# Patient Record
Sex: MALE | Race: BLACK OR AFRICAN AMERICAN | NOT HISPANIC OR LATINO | Employment: FULL TIME | URBAN - METROPOLITAN AREA
[De-identification: names, ages, dates, MRNs, and addresses within clinical notes are randomized per-mention and may not be internally consistent; named-entity substitution may affect disease eponyms.]

---

## 2020-06-30 PROCEDURE — U0003 INFECTIOUS AGENT DETECTION BY NUCLEIC ACID (DNA OR RNA); SEVERE ACUTE RESPIRATORY SYNDROME CORONAVIRUS 2 (SARS-COV-2) (CORONAVIRUS DISEASE [COVID-19]), AMPLIFIED PROBE TECHNIQUE, MAKING USE OF HIGH THROUGHPUT TECHNOLOGIES AS DESCRIBED BY CMS-2020-01-R: HCPCS | Performed by: NURSE PRACTITIONER

## 2020-07-02 ENCOUNTER — TELEPHONE (OUTPATIENT)
Dept: URGENT CARE | Facility: CLINIC | Age: 40
End: 2020-07-02

## 2020-07-02 NOTE — TELEPHONE ENCOUNTER
Pt called [facility] returning vm; informed of negative covid result. States he is still symptom free. No questions.

## 2020-11-19 PROCEDURE — U0003 INFECTIOUS AGENT DETECTION BY NUCLEIC ACID (DNA OR RNA); SEVERE ACUTE RESPIRATORY SYNDROME CORONAVIRUS 2 (SARS-COV-2) (CORONAVIRUS DISEASE [COVID-19]), AMPLIFIED PROBE TECHNIQUE, MAKING USE OF HIGH THROUGHPUT TECHNOLOGIES AS DESCRIBED BY CMS-2020-01-R: HCPCS | Performed by: PHYSICIAN ASSISTANT

## 2021-01-15 ENCOUNTER — IMMUNIZATION (OUTPATIENT)
Dept: VACCINE CLINIC | Facility: HOSPITAL | Age: 41
End: 2021-01-15

## 2021-01-15 PROCEDURE — 0001A: CPT | Performed by: INTERNAL MEDICINE

## 2021-01-15 PROCEDURE — 0002A: CPT | Performed by: INTERNAL MEDICINE

## 2021-01-15 PROCEDURE — 91300 HC SARSCOV02 VAC 30MCG/0.3ML IM: CPT | Performed by: INTERNAL MEDICINE

## 2021-02-05 ENCOUNTER — IMMUNIZATION (OUTPATIENT)
Dept: VACCINE CLINIC | Facility: HOSPITAL | Age: 41
End: 2021-02-05

## 2021-02-05 PROCEDURE — 0002A: CPT | Performed by: INTERNAL MEDICINE

## 2021-02-05 PROCEDURE — 91300 HC SARSCOV02 VAC 30MCG/0.3ML IM: CPT | Performed by: INTERNAL MEDICINE

## 2022-01-15 PROCEDURE — U0004 COV-19 TEST NON-CDC HGH THRU: HCPCS | Performed by: NURSE PRACTITIONER

## 2023-01-13 ENCOUNTER — LAB (OUTPATIENT)
Dept: INTERNAL MEDICINE | Facility: CLINIC | Age: 43
End: 2023-01-13
Payer: COMMERCIAL

## 2023-01-13 ENCOUNTER — OFFICE VISIT (OUTPATIENT)
Dept: INTERNAL MEDICINE | Facility: CLINIC | Age: 43
End: 2023-01-13
Payer: COMMERCIAL

## 2023-01-13 VITALS
HEART RATE: 73 BPM | DIASTOLIC BLOOD PRESSURE: 80 MMHG | TEMPERATURE: 98.2 F | SYSTOLIC BLOOD PRESSURE: 128 MMHG | HEIGHT: 71 IN | WEIGHT: 315 LBS | OXYGEN SATURATION: 97 % | BODY MASS INDEX: 44.1 KG/M2

## 2023-01-13 DIAGNOSIS — Z11.59 NEED FOR HEPATITIS C SCREENING TEST: ICD-10-CM

## 2023-01-13 DIAGNOSIS — M54.41 ACUTE BILATERAL LOW BACK PAIN WITH RIGHT-SIDED SCIATICA: Primary | ICD-10-CM

## 2023-01-13 DIAGNOSIS — E66.01 CLASS 3 SEVERE OBESITY WITH BODY MASS INDEX (BMI) OF 45.0 TO 49.9 IN ADULT, UNSPECIFIED OBESITY TYPE, UNSPECIFIED WHETHER SERIOUS COMORBIDITY PRESENT: ICD-10-CM

## 2023-01-13 DIAGNOSIS — I25.118 CORONARY ARTERY DISEASE WITH OTHER FORM OF ANGINA PECTORIS, UNSPECIFIED VESSEL OR LESION TYPE, UNSPECIFIED WHETHER NATIVE OR TRANSPLANTED HEART: ICD-10-CM

## 2023-01-13 DIAGNOSIS — E78.5 DYSLIPIDEMIA: ICD-10-CM

## 2023-01-13 DIAGNOSIS — I10 PRIMARY HYPERTENSION: ICD-10-CM

## 2023-01-13 LAB
BASOPHILS # BLD AUTO: 0.03 10*3/MM3 (ref 0–0.2)
BASOPHILS NFR BLD AUTO: 0.2 % (ref 0–1.5)
DEPRECATED RDW RBC AUTO: 39.6 FL (ref 37–54)
EOSINOPHIL # BLD AUTO: 0.05 10*3/MM3 (ref 0–0.4)
EOSINOPHIL NFR BLD AUTO: 0.4 % (ref 0.3–6.2)
ERYTHROCYTE [DISTWIDTH] IN BLOOD BY AUTOMATED COUNT: 12.4 % (ref 12.3–15.4)
HCT VFR BLD AUTO: 42.2 % (ref 37.5–51)
HGB BLD-MCNC: 14.2 G/DL (ref 13–17.7)
IMM GRANULOCYTES # BLD AUTO: 0.25 10*3/MM3 (ref 0–0.05)
IMM GRANULOCYTES NFR BLD AUTO: 2.1 % (ref 0–0.5)
LYMPHOCYTES # BLD AUTO: 1.37 10*3/MM3 (ref 0.7–3.1)
LYMPHOCYTES NFR BLD AUTO: 11.3 % (ref 19.6–45.3)
MCH RBC QN AUTO: 29.9 PG (ref 26.6–33)
MCHC RBC AUTO-ENTMCNC: 33.6 G/DL (ref 31.5–35.7)
MCV RBC AUTO: 88.8 FL (ref 79–97)
MONOCYTES # BLD AUTO: 0.98 10*3/MM3 (ref 0.1–0.9)
MONOCYTES NFR BLD AUTO: 8.1 % (ref 5–12)
NEUTROPHILS NFR BLD AUTO: 77.9 % (ref 42.7–76)
NEUTROPHILS NFR BLD AUTO: 9.45 10*3/MM3 (ref 1.7–7)
NRBC BLD AUTO-RTO: 0 /100 WBC (ref 0–0.2)
PLATELET # BLD AUTO: 336 10*3/MM3 (ref 140–450)
PMV BLD AUTO: 10.7 FL (ref 6–12)
RBC # BLD AUTO: 4.75 10*6/MM3 (ref 4.14–5.8)
WBC NRBC COR # BLD: 12.13 10*3/MM3 (ref 3.4–10.8)

## 2023-01-13 PROCEDURE — 80061 LIPID PANEL: CPT | Performed by: STUDENT IN AN ORGANIZED HEALTH CARE EDUCATION/TRAINING PROGRAM

## 2023-01-13 PROCEDURE — 99214 OFFICE O/P EST MOD 30 MIN: CPT | Performed by: STUDENT IN AN ORGANIZED HEALTH CARE EDUCATION/TRAINING PROGRAM

## 2023-01-13 PROCEDURE — 83036 HEMOGLOBIN GLYCOSYLATED A1C: CPT | Performed by: STUDENT IN AN ORGANIZED HEALTH CARE EDUCATION/TRAINING PROGRAM

## 2023-01-13 PROCEDURE — 80050 GENERAL HEALTH PANEL: CPT | Performed by: STUDENT IN AN ORGANIZED HEALTH CARE EDUCATION/TRAINING PROGRAM

## 2023-01-13 PROCEDURE — 86803 HEPATITIS C AB TEST: CPT | Performed by: STUDENT IN AN ORGANIZED HEALTH CARE EDUCATION/TRAINING PROGRAM

## 2023-01-13 RX ORDER — TIZANIDINE 4 MG/1
4 TABLET ORAL NIGHTLY PRN
Qty: 30 TABLET | Refills: 0 | Status: SHIPPED | OUTPATIENT
Start: 2023-01-13 | End: 2023-01-25 | Stop reason: SDUPTHER

## 2023-01-13 NOTE — PROGRESS NOTES
Office Note     Name: Jean Glynn    : 1980     MRN: [id number]     Chief Complaint  Back Pain (Sciatic nerve pain, Establish care did not like previous [facility]/)    Subjective     History of Present Illness:  Jean Glynn is a 42 y.o. male who presents today for       Back pain  Pain initially started at the end of November.  When the patient woke up from sleep  Location of the pain is lower lumbar back.  Bilateral and feels like a dull ache.  Does have associated right lateral leg numbness that shoots down all the way down to his foot.  He has tried muscle relaxants ibuprofen and Tylenol.  Says that ibuprofen works the best.  Pain is improved with certain positions such as when he bends down or stretches out his legs.  He does also improve when he is able to get moving.  He does need to sit down frequently because it helps with the pain.  Is recently seen at the urgent care on 2023 and was started on a steroid taper.  He denies any traumatic injuries to the back no weakness no loss of sensation denies any bladder or bowel movement problems.      History of hypertension blood pressure stable in office  Currently taking losartan 50 mg and coreg 25mg.    He has a history of MI is followed by cardiology at [facility].  He is currently on statin and aspirin carvedilol and Plavix    Dyslipidemia  On statin  Tolerating medication    Review of Systems:   Review of Systems   All other systems reviewed and are negative.      Past Medical History:   Past Medical History:   Diagnosis Date   • Hyperlipidemia    • Hypertension    • Myocardial infarction (HCC)    • Sleep apnea        Past Surgical History:   Past Surgical History:   Procedure Laterality Date   • ADENOIDECTOMY     • TONSILLECTOMY         Family History:   Family History   Problem Relation Age of Onset   • Diabetes Paternal Uncle    • Cancer Paternal Grandmother        Social History:   Social History     Socioeconomic History   • Marital status:  "Single   Tobacco Use   • Smoking status: Never   • Smokeless tobacco: Current   Vaping Use   • Vaping Use: Every day   Substance and Sexual Activity   • Alcohol use: Not Currently   • Drug use: Defer   • Sexual activity: Never       Immunizations:   Immunization History   Administered Date(s) Administered   • COVID-19 (PFIZER) PURPLE CAP 01/15/2021, 02/05/2021        Medications:     Current Outpatient Medications:   •  aspirin 81 MG EC tablet, Take 1 tablet by mouth Daily., Disp: , Rfl:   •  atorvastatin (LIPITOR) 80 MG tablet, Take 1 tablet by mouth Daily., Disp: , Rfl:   •  carvedilol (COREG) 25 MG tablet, Take 2 tablets by mouth 2 (Two) Times a Day With Meals., Disp: , Rfl:   •  clopidogrel (PLAVIX) 75 MG tablet, Take 1 tablet by mouth Daily., Disp: , Rfl:   •  losartan (COZAAR) 50 MG tablet, Take 50 mg by mouth Daily., Disp: , Rfl:   •  cephalexin (KEFLEX) 500 MG capsule, Take 1 capsule by mouth 3 (Three) Times a Day., Disp: 30 capsule, Rfl: 0  •  naproxen (EC NAPROSYN) 500 MG EC tablet, Take 1 tablet by mouth 2 (Two) Times a Day As Needed (pain)., Disp: 30 tablet, Rfl: 0  •  nitroglycerin (NITROSTAT) 0.4 MG SL tablet, Place 1 tablet under the tongue As Needed., Disp: , Rfl:   •  predniSONE (DELTASONE) 10 MG tablet, DAILY TAPER - 6/6/5/5/4/4/3/3/2/2/1/1 THEN D/C, Disp: 42 tablet, Rfl: 0  •  tiZANidine (ZANAFLEX) 4 MG tablet, Take 1 tablet by mouth At Night As Needed for Muscle Spasms., Disp: 30 tablet, Rfl: 0    Allergies:   Allergies   Allergen Reactions   • Codeine Itching, Hives and Unknown (See Comments)   • Ace Inhibitors Other (See Comments), Rash and Unknown (See Comments)     Coughing, seizures  Seizures  Coughing, seizures       Objective     Vital Signs  /80   Pulse 73   Temp 98.2 °F (36.8 °C) (Temporal)   Ht 180 cm (70.87\")   Wt (!) 159 kg (351 lb 9.6 oz)   SpO2 97%   BMI 49.22 kg/m²   Estimated body mass index is 49.22 kg/m² as calculated from the following:    Height as of this " "encounter: 180 cm (70.87\").    Weight as of this encounter: 159 kg (351 lb 9.6 oz).    Class 3 Severe Obesity (BMI >=40). Obesity-related health conditions include the following: obstructive sleep apnea, hypertension, coronary heart disease and dyslipidemias. Obesity is unchanged. BMI is is above average; BMI management plan is completed. We discussed low calorie, low carb based diet program, portion control and increasing exercise.      Physical Exam  Constitutional:       Appearance: Normal appearance. He is obese.   Cardiovascular:      Rate and Rhythm: Regular rhythm.      Pulses: Normal pulses.      Heart sounds: No murmur heard.  Pulmonary:      Effort: Pulmonary effort is normal.      Breath sounds: Normal breath sounds.   Abdominal:      General: Abdomen is flat. There is no distension.      Palpations: Abdomen is soft.      Tenderness: There is no abdominal tenderness.   Musculoskeletal:      Lumbar back: Tenderness and bony tenderness present. Decreased range of motion. Negative right straight leg raise test and negative left straight leg raise test.   Skin:     General: Skin is warm.   Neurological:      Mental Status: He is alert.          Result Review :                  Assessment and Plan     1. Acute bilateral low back pain with right-sided sciatica  Recommended treatment with OTC NSAID/Tylenol, muscle relaxant and physical therapy  IF no improvement, can consider CT scan for additional imaging, patient unable to do MRI unless open MRI due to body habitus    - Ambulatory Referral to Physical Therapy Evaluate and treat  - XR Spine Lumbar 2 or 3 View; Future  - tiZANidine (ZANAFLEX) 4 MG tablet; Take 1 tablet by mouth At Night As Needed for Muscle Spasms.  Dispense: 30 tablet; Refill: 0    2. Primary hypertension  Controlled,  Continue with current medicatoin  Low salt diet, physical activity and weight controlled discussed    - CBC Auto Differential  - Comprehensive Metabolic Panel    3. Class 3 " severe obesity with body mass index (BMI) of 45.0 to 49.9 in adult, unspecified obesity type, unspecified whether serious comorbidity present (HCC)  Discussed risk associated with obesity. he was given instructions on calorie counting as well as on decreasing carbohydrate intake. he was also encouraged to start exercise regimen.  Instructed patient to download fitness joycelyn on his smart phone to aid in calorie counting and exercise tracking.    - TSH  - Hemoglobin A1c    4. Dyslipidemia  Controlled  Continue with atorvastatin 80mg    - Lipid Panel  - TSH    5. Coronary artery disease with other form of angina pectoris, unspecified vessel or lesion type, unspecified whether native or transplanted heart (HCC)  Continue to follow up with cardiologist  Continue with dual anti-platelet therapy, on Ace- inhibitor, on statin  - Lipid Panel  - TSH    6. Need for hepatitis C screening test    - Hepatitis C antibody; Future       Follow Up  Return in about 8 weeks (around 3/10/2023) for back pain, Annual physical.    Aakash Onofre MD  [facility] [address]  [facility]  [address]  [address] [address] [phone]

## 2023-01-14 LAB
ALBUMIN SERPL-MCNC: 4.3 G/DL (ref 3.5–5.2)
ALBUMIN/GLOB SERPL: 1.5 G/DL
ALP SERPL-CCNC: 70 U/L (ref 39–117)
ALT SERPL W P-5'-P-CCNC: 39 U/L (ref 1–41)
ANION GAP SERPL CALCULATED.3IONS-SCNC: 9 MMOL/L (ref 5–15)
AST SERPL-CCNC: 22 U/L (ref 1–40)
BILIRUB SERPL-MCNC: 0.4 MG/DL (ref 0–1.2)
BUN SERPL-MCNC: 18 MG/DL (ref 6–20)
BUN/CREAT SERPL: 19.1 (ref 7–25)
CALCIUM SPEC-SCNC: 9.2 MG/DL (ref 8.6–10.5)
CHLORIDE SERPL-SCNC: 100 MMOL/L (ref 98–107)
CHOLEST SERPL-MCNC: 183 MG/DL (ref 0–200)
CO2 SERPL-SCNC: 29 MMOL/L (ref 22–29)
CREAT SERPL-MCNC: 0.94 MG/DL (ref 0.76–1.27)
EGFRCR SERPLBLD CKD-EPI 2021: 103.2 ML/MIN/1.73
GLOBULIN UR ELPH-MCNC: 2.9 GM/DL
GLUCOSE SERPL-MCNC: 110 MG/DL (ref 65–99)
HBA1C MFR BLD: 6.7 % (ref 4.8–5.6)
HCV AB SER DONR QL: NORMAL
HDLC SERPL-MCNC: 48 MG/DL (ref 40–60)
LDLC SERPL CALC-MCNC: 121 MG/DL (ref 0–100)
LDLC/HDLC SERPL: 2.5 {RATIO}
POTASSIUM SERPL-SCNC: 4.3 MMOL/L (ref 3.5–5.2)
PROT SERPL-MCNC: 7.2 G/DL (ref 6–8.5)
SODIUM SERPL-SCNC: 138 MMOL/L (ref 136–145)
TRIGL SERPL-MCNC: 75 MG/DL (ref 0–150)
TSH SERPL DL<=0.05 MIU/L-ACNC: 1.58 UIU/ML (ref 0.27–4.2)
VLDLC SERPL-MCNC: 14 MG/DL (ref 5–40)

## 2023-01-20 ENCOUNTER — HOSPITAL ENCOUNTER (OUTPATIENT)
Dept: PHYSICAL THERAPY | Facility: HOSPITAL | Age: 43
Setting detail: THERAPIES SERIES
Discharge: HOME | End: 2023-01-20
Payer: COMMERCIAL

## 2023-01-20 DIAGNOSIS — M54.41 ACUTE LOW BACK PAIN WITH RIGHT-SIDED SCIATICA, UNSPECIFIED BACK PAIN LATERALITY: ICD-10-CM

## 2023-01-20 DIAGNOSIS — M54.41 ACUTE BACK PAIN WITH SCIATICA, RIGHT: Primary | ICD-10-CM

## 2023-01-20 PROCEDURE — 97161 PT EVAL LOW COMPLEX 20 MIN: CPT

## 2023-01-20 NOTE — THERAPY EVALUATION
Outpatient Physical Therapy Ortho Initial Evaluation  [facility]     Patient Name: Jean Glynn  : 1980  MRN: [id number]  Today's Date: 2023      Visit Date: 2023    There is no problem list on file for this patient.       Past Medical History:   Diagnosis Date   • Hyperlipidemia    • Hypertension    • Myocardial infarction (HCC)    • Sleep apnea         Past Surgical History:   Procedure Laterality Date   • ADENOIDECTOMY     • TONSILLECTOMY         Visit Dx:     ICD-10-CM ICD-9-CM   1. Acute back pain with sciatica, right  M54.41 724.3   2. Acute low back pain with right-sided sciatica, unspecified back pain laterality  M54.41 724.2     724.3              PT Ortho     Row Name 23 0800       Subjective Comments    Subjective Comments Patient reports that his low back pain started the day after his birthday with no associated trauma trauma or falls to trigger the issue. He reports that the pain has progressed over the last 2 months to the point that he presented to urgent care for treatment.  Patient reports that the pain progressed from his low back and radiates down the posterior aspect leg all the way down to his foot.  Patient endorses numbness and tingling to the entire foot.  He noted that he was given a Dosepak which alleviated his symptoms down his leg and his low back.  However 2 days after completing his Dosepak the pain returned.  Patient reports no history of low back pain prior to this onset.  Patient reports pain gets worse when bending over, while working all day, and is positional.  Patient noted that he feels better when he stands up and starts to move but has difficulty getting out of bed due to pain.  He reports that he has been taking ibuprofen throughout the day to help manage his pain which helps some.  -FW       Subjective Pain    Able to rate subjective pain? yes  -FW    Pre-Treatment Pain Level 7  -FW    Post-Treatment Pain Level 6  -FW       Posture/Observations     Posture- WNL Posture is WNL  -FW    Alignment Options Forward head;Rounded shoulders  -FW       Special Tests/Palpation    Special Tests/Palpation Lumbar/SI  -FW       Lumbosacral Accessory Motions    Lumbosacral Accessory Motions Tested? Yes  -FW    PA Glide- L1 --  difficult to assess lumbar vertebral accessory motions due to body habitus  -FW       Lumbar/SI Special Tests    Slump Test (Neural Tension) Positive;Right:  -FW    SLR (Neural Tension) Positive;Right:;Negative;Left:  -FW    Lumbar/SI Special Tests Comments no tenderness to palpation along the lumbar spine; taught lumbar paraspinals  -FW       General ROM    GENERAL ROM COMMENTS BLE WFL  -FW       MMT (Manual Muscle Testing)    Rt Lower Ext Rt Knee Flexion;Rt Ankle Dorsiflexion;Rt Knee Extension  -FW    Lt Lower Ext Lt Hip WNL;Lt Ankle WNL;Lt Knee WNL  -FW       MMT Right Lower Ext    Rt Knee Extension MMT, Gross Movement (5/5) normal  -FW    Rt Knee Flexion MMT, Gross Movement (4+/5) good plus  -FW    Rt Ankle Dorsiflexion MMT, Gross Movement (5/5) normal  -FW       Sensation    Light Touch Partial deficits in the RLE  -FW    Additional Comments His numbness and tingling in the entire aspect of the foot which improved with manual traction  -FW          User Key  (r) = Recorded By, (t) = Taken By, (c) = Cosigned By    Initials Name Provider Type    FW Guille Delacruz, PT Physical Therapist                            Therapy Education  Education Details: HEP: medbridge [id number], mechanics to avoid flexion, and pain managment  Given: HEP, Symptoms/condition management, Pain management, Posture/body mechanics      PT OP Goals     Row Name 01/20/23 0900          PT Short Term Goals    STG Date to Achieve 02/10/23  -FW     STG 1 He will report improvement in symptoms by 50% or more with ADLs and work duties  -FW     STG 1 Progress New  -FW     STG 2 Reported parents HEP program for optimal outcomes and POC  -FW     STG 2 Progress New  -FW     STG 3  Patient will be able to return to the light to moderate exercise with pain equal less than or equal to 3 out of 10  -FW     STG 3 Progress New  -FW        Long Term Goals    LTG Date to Achieve 03/03/23  -FW     LTG 1 Patient will report improvement in symptoms by 75% or more with all ADLs and associated work duties  -FW     LTG 1 Progress New  -FW     LTG 2 Patient will be able to sit for longer than 60 minutes and lay in all positions in bed without pain or discomfort  -FW     LTG 2 Progress New  -FW     LTG 3 She will be able to return to all exercises of moderate to heavy strain with pain less than or equal to 1 out of 10  -FW     LTG 3 Progress New  -FW        Time Calculation    PT Goal Re-Cert Due Date 02/19/23  -FW           User Key  (r) = Recorded By, (t) = Taken By, (c) = Cosigned By    Initials Name Provider Type    FW Guille Delacruz, PT Physical Therapist                 PT Assessment/Plan     Row Name 01/20/23 0900          PT Assessment    Functional Limitations Impaired gait;Performance in self-care ADL;Performance in sport activities;Performance in work activities;Performance in leisure activities;Limitations in functional capacity and performance;Limitations in community activities;Limitation in home management  -FW     Impairments Gait;Endurance;Impaired aerobic capacity;Impaired flexibility;Pain;Sensation;Range of motion  -FW     Assessment Comments Patient presents with evolving symptoms of low complexity.  Patient presents with signs and symptoms of low back pain with sciatica with likely lumbar disc involvement.  Patient with decreased numbness tingling and pain with manual traction and extension of the lumbar spine.  Skilled physical therapy services warranted to address improvements upon work duties, ADLs, and exercise in order to meet patient goals and maximize function/decrease pain.  -FW     Please refer to paper survey for additional self-reported information Yes  -FW     Rehab  Potential Good  -FW     Patient/caregiver participated in establishment of treatment plan and goals Yes  -FW     Patient would benefit from skilled therapy intervention Yes  -FW        PT Plan    PT Frequency 1x/week;2x/week  -FW     Predicted Duration of Therapy Intervention (PT) 8 to 10 weeks  -FW     Planned CPT's? PT EVAL LOW COMPLEXITY: 60454;PT RE-EVAL: 07609;PT THER PROC EA 15 MIN: 64791;PT THER ACT EA 15 MIN: 64904;PT MANUAL THERAPY EA 15 MIN: 14343;PT NEUROMUSC RE-EDUCATION EA 15 MIN: 59708;PT ELECTRICAL STIM UNATTEND: ;PT TRACTION LUMBAR: 26815  -FW     PT Plan Comments Plan to address patient's impairments and limitations with skilled PT interventions focusing on improving her overall decrease in pain for improved ability to perform activities of daily living, home management, return to activity was a leisure, and all associated work duties.  -FW           User Key  (r) = Recorded By, (t) = Taken By, (c) = Cosigned By    Initials Name Provider Type    Guille De Dios, PT Physical Therapist                   OP Exercises     Row Name 01/20/23 0800             Subjective Comments    Subjective Comments Patient reports that his low back pain started the day after his birthday with no associated trauma trauma or falls to trigger the issue. He reports that the pain has progressed over the last 2 months to the point that he presented to urgent care for treatment.  Patient reports that the pain progressed from his low back and radiates down the posterior aspect leg all the way down to his foot.  Patient endorses numbness and tingling to the entire foot.  He noted that he was given a Dosepak which alleviated his symptoms down his leg and his low back.  However 2 days after completing his Dosepak the pain returned.  Patient reports no history of low back pain prior to this onset.  Patient reports pain gets worse when bending over, while working all day, and is positional.  Patient noted that he feels  better when he stands up and starts to move but has difficulty getting out of bed due to pain.  He reports that he has been taking ibuprofen throughout the day to help manage his pain which helps some.  -FW         Subjective Pain    Able to rate subjective pain? yes  -FW      Pre-Treatment Pain Level 7  -FW      Post-Treatment Pain Level 6  -FW         Exercise 1    Exercise Name 1 prone press up on elbows  -FW      Sets 1 1  -FW      Reps 1 10  -FW      Additional Comments pt noted decreased numbness in his right foot  -FW         Exercise 2    Exercise Name 2 standing lumbar extension w/ counter support  -FW      Sets 2 1  -FW      Reps 2 10  -FW      Additional Comments noted relief of pain  -FW         Exercise 3    Exercise Name 3 standing distal sciatic nerve slider on step  -FW      Sets 3 1  -FW      Reps 3 10  -FW            User Key  (r) = Recorded By, (t) = Taken By, (c) = Cosigned By    Initials Name Provider Type    FW Guille Delacruz, PT Physical Therapist                              Outcome Measure Options: Modified Oswestry  Modified Oswestry  Modified Oswestry Score/Comments: 22% (11/50)      Time Calculation:     Start Time: 0800  Untimed Charges  PT Eval/Re-eval Minutes: 60  Total Minutes  Untimed Charges Total Minutes: 60   Total Minutes: 60     Therapy Charges for Today     Code Description Service Date Service Provider Modifiers Qty    51963929969 HC PT EVAL LOW COMPLEXITY 4 1/20/2023 Guille Delacruz, PT GP 1          PT G-Codes  Outcome Measure Options: Modified Oswestry  Modified Oswestry Score/Comments: 22% (11/50)         Guille Delacruz PT  1/20/2023

## 2023-01-25 DIAGNOSIS — M54.41 ACUTE BILATERAL LOW BACK PAIN WITH RIGHT-SIDED SCIATICA: ICD-10-CM

## 2023-01-25 RX ORDER — TIZANIDINE 4 MG/1
4 TABLET ORAL NIGHTLY PRN
Qty: 30 TABLET | Refills: 3 | Status: SHIPPED | OUTPATIENT
Start: 2023-01-25

## 2023-01-26 ENCOUNTER — TELEPHONE (OUTPATIENT)
Dept: INTERNAL MEDICINE | Facility: CLINIC | Age: 43
End: 2023-01-26
Payer: COMMERCIAL

## 2023-01-26 RX ORDER — METFORMIN HYDROCHLORIDE 500 MG/1
1000 TABLET, EXTENDED RELEASE ORAL
Qty: 90 TABLET | Refills: 1 | Status: SHIPPED | OUTPATIENT
Start: 2023-01-26

## 2023-02-02 ENCOUNTER — HOSPITAL ENCOUNTER (OUTPATIENT)
Dept: PHYSICAL THERAPY | Facility: HOSPITAL | Age: 43
Setting detail: THERAPIES SERIES
Discharge: HOME | End: 2023-02-02
Payer: COMMERCIAL

## 2023-02-02 DIAGNOSIS — M54.41 ACUTE BACK PAIN WITH SCIATICA, RIGHT: Primary | ICD-10-CM

## 2023-02-02 DIAGNOSIS — M54.41 ACUTE LOW BACK PAIN WITH RIGHT-SIDED SCIATICA, UNSPECIFIED BACK PAIN LATERALITY: ICD-10-CM

## 2023-02-02 PROCEDURE — 97140 MANUAL THERAPY 1/> REGIONS: CPT

## 2023-02-02 PROCEDURE — 97110 THERAPEUTIC EXERCISES: CPT

## 2023-02-02 NOTE — THERAPY TREATMENT NOTE
Outpatient Physical Therapy Ortho Treatment Note   [facility]     Patient Name: Jean Glynn  : 1980  MRN: [id number]  Today's Date: 2023      Visit Date: 2023    Visit Dx:    ICD-10-CM ICD-9-CM   1. Acute back pain with sciatica, right  M54.41 724.3   2. Acute low back pain with right-sided sciatica, unspecified back pain laterality  M54.41 724.2     724.3       There is no problem list on file for this patient.       Past Medical History:   Diagnosis Date   • Hyperlipidemia    • Hypertension    • Myocardial infarction (HCC)    • Sleep apnea         Past Surgical History:   Procedure Laterality Date   • ADENOIDECTOMY     • TONSILLECTOMY                          PT Assessment/Plan     Row Name 23 0916          PT Assessment    Assessment Comments Patient reports mild stretch retraction.  He was instructed to notice symptoms over the next day or  to check for efficacy efficacy of traction.  HEP updated this date to help with the distal/radiating symptoms down his right leg. Access Code: [email or web address]  -FW        PT Plan    PT Plan Comments Continue POC  -FW           User Key  (r) = Recorded By, (t) = Taken By, (c) = Cosigned By    Initials Name Provider Type    FW Guille Delacruz, PT Physical Therapist                   OP Exercises     Row Name 23 0923 0830 23 0800       Subjective Comments    Subjective Comments --  -FW Patient reports mild improvement since his last visit.  Having difficulty bending down and lifting heavy objects.  Compliance with his HEP  -FW --       Subjective Pain    Able to rate subjective pain? --  -FW yes  -FW --  -FW    Pre-Treatment Pain Level --  -FW 3  -FW --  -FW    Post-Treatment Pain Level --  -FW -- --       Total Minutes    57376 - PT Therapeutic Exercise Minutes --  -FW 15  -FW --    05589 - PT Manual Therapy Minutes -- 10  -FW --       Exercise 1    Exercise Name 1 --  -FW standing lumbar extension  -FW --  -FW    Sets 1 --  -FW 2   -FW --    Reps 1 --  -FW 10  -FW --       Exercise 2    Exercise Name 2 --  -FW standing calf/hamstring stretch  -FW --    Sets 2 --  -FW 1  -FW --    Reps 2 --  -FW 2  -FW --    Additional Comments --  -FW -- --       Exercise 3    Exercise Name 3 --  -FW LTR  -FW --    Sets 3 --  -FW 1  -FW --    Reps 3 --  -FW 10  -FW --       Exercise 4    Exercise Name 4 -- bridge  -FW --    Sets 4 -- 2  -FW --    Reps 4 -- 10  -FW --       Exercise 5    Exercise Name 5 -- supine sciatic slider  -FW --    Sets 5 -- 1  -FW --    Reps 5 -- 15  -FW --          User Key  (r) = Recorded By, (t) = Taken By, (c) = Cosigned By    Initials Name Provider Type     Guille Delacruz PT Physical Therapist                         Manual Rx (last 36 hours)     Manual Treatments     Row Name 02/02/23 0830             Total Minutes    83614 - PT Manual Therapy Minutes 10  -FW         Manual Rx 1    Manual Rx 1 Location lumbar  -FW      Manual Rx 1 Type manual traction w/ belt  -FW      Manual Rx 1 Duration 10  -FW            User Key  (r) = Recorded By, (t) = Taken By, (c) = Cosigned By    Initials Name Provider Type     Guille Delacruz PT Physical Therapist                                   Time Calculation:   Start Time: 0830  Timed Charges  01939 - PT Therapeutic Exercise Minutes: 15  25656 - PT Manual Therapy Minutes: 10  Total Minutes  Timed Charges Total Minutes: 25   Total Minutes: 25  Therapy Charges for Today     Code Description Service Date Service Provider Modifiers Qty    99008301929 HC PT THER PROC EA 15 MIN 2/2/2023 Guille Delacruz, PT GP 1    45574581075 HC PT MANUAL THERAPY EA 15 MIN 2/2/2023 Guille Delacruz, PT GP 1                    Guille Delacruz PT  2/2/2023

## 2023-02-06 ENCOUNTER — HOSPITAL ENCOUNTER (OUTPATIENT)
Dept: PHYSICAL THERAPY | Facility: HOSPITAL | Age: 43
Setting detail: THERAPIES SERIES
Discharge: HOME | End: 2023-02-06
Payer: COMMERCIAL

## 2023-02-06 DIAGNOSIS — M54.41 ACUTE LOW BACK PAIN WITH RIGHT-SIDED SCIATICA, UNSPECIFIED BACK PAIN LATERALITY: ICD-10-CM

## 2023-02-06 DIAGNOSIS — M54.41 ACUTE BACK PAIN WITH SCIATICA, RIGHT: Primary | ICD-10-CM

## 2023-02-06 PROCEDURE — 97110 THERAPEUTIC EXERCISES: CPT

## 2023-02-06 PROCEDURE — 97012 MECHANICAL TRACTION THERAPY: CPT

## 2023-02-06 NOTE — THERAPY TREATMENT NOTE
Outpatient Physical Therapy Ortho Treatment Note   [facility]     Patient Name: Jean Glynn  : 1980  MRN: [id number]  Today's Date: 2023      Visit Date: 2023    Visit Dx:    ICD-10-CM ICD-9-CM   1. Acute back pain with sciatica, right  M54.41 724.3   2. Acute low back pain with right-sided sciatica, unspecified back pain laterality  M54.41 724.2     724.3       There is no problem list on file for this patient.       Past Medical History:   Diagnosis Date   • Hyperlipidemia    • Hypertension    • Myocardial infarction (HCC)    • Sleep apnea         Past Surgical History:   Procedure Laterality Date   • ADENOIDECTOMY     • TONSILLECTOMY                          PT Assessment/Plan     Row Name 23 09          PT Assessment    Assessment Comments Patient noted improvement in pain following the traction during his last session.  This session the patient was on mechanical traction and will check with long-term efficacy during his next visit.  Patient HEP updated with bird-dog and cat cow exercises.  Briefly discussed benefit of weight loss with patient at end of session.  Patient noted hip pain in the piriformis region which will be addressed during his next visit. Access Code: [email or web address]  -FW        PT Plan    PT Plan Comments Continue POC  -FW           User Key  (r) = Recorded By, (t) = Taken By, (c) = Cosigned By    Initials Name Provider Type    FW Guille Delacruz, PT Physical Therapist                 Modalities     Row Name 23 0900             Traction 16799    Traction Type Lumbar  -FW      PT Traction Rx Minutes 12  -FW      Duration Intermittent  -FW      Position Supine  legs elevated  -FW      Weight 75  -FW      Hold 60  -FW      Relax 20  -FW            User Key  (r) = Recorded By, (t) = Taken By, (c) = Cosigned By    Initials Name Provider Type    FW Guille Delacruz, PT Physical Therapist               OP Exercises     Row Name 23 0834             Subjective  Comments    Subjective Comments Patient reports improvement in pain in his low back and that he is moving better; however, he continues with radiating pain into his right hip, hamstrings, and calf.  He noted compliance with his HEP.  Inquired whether or not chiropractic care is indicated-which was discussed with patient that it is up to his discretion.  -FW         Subjective Pain    Able to rate subjective pain? yes  -FW      Pre-Treatment Pain Level 3  -FW      Post-Treatment Pain Level 2  -FW         Total Minutes    98462 - PT Therapeutic Exercise Minutes 12  -FW         Exercise 1    Exercise Name 1 ltr  -FW      Sets 1 1  -FW      Reps 1 15 EACH  -FW         Exercise 2    Exercise Name 2 bridge  -FW      Sets 2 1  -FW      Reps 2 15  -FW         Exercise 3    Exercise Name 3 supine sciatic slider in hooklying  -FW      Sets 3 1  -FW      Reps 3 15  -FW         Exercise 4    Exercise Name 4 bird dog  -FW      Sets 4 1  -FW      Reps 4 5  -FW            User Key  (r) = Recorded By, (t) = Taken By, (c) = Cosigned By    Initials Name Provider Type    FW Guille Delacruz PT Physical Therapist                                                Time Calculation:   Start Time: 0830  Timed Charges  72242 - PT Therapeutic Exercise Minutes: 12  Untimed Charges  PT Traction Rx Minutes: 12  Total Minutes  Timed Charges Total Minutes: 12  Untimed Charges Total Minutes: 12   Total Minutes: 12  Therapy Charges for Today     Code Description Service Date Service Provider Modifiers Qty    95685949675 HC PT THER PROC EA 15 MIN 2/6/2023 Guille Delacruz, PT GP 1    04654986034 HC PT TRACTION LUMBAR 2/6/2023 Guille Delacruz, HOMAR GP 1                    Guille Delacruz PT  2/6/2023

## 2023-02-13 ENCOUNTER — HOSPITAL ENCOUNTER (OUTPATIENT)
Dept: PHYSICAL THERAPY | Facility: HOSPITAL | Age: 43
Setting detail: THERAPIES SERIES
Discharge: HOME | End: 2023-02-13
Payer: COMMERCIAL

## 2023-02-13 DIAGNOSIS — M54.41 ACUTE BACK PAIN WITH SCIATICA, RIGHT: Primary | ICD-10-CM

## 2023-02-13 DIAGNOSIS — M54.41 ACUTE LOW BACK PAIN WITH RIGHT-SIDED SCIATICA, UNSPECIFIED BACK PAIN LATERALITY: ICD-10-CM

## 2023-02-13 PROCEDURE — 97012 MECHANICAL TRACTION THERAPY: CPT

## 2023-02-13 PROCEDURE — 97110 THERAPEUTIC EXERCISES: CPT

## 2023-02-13 NOTE — THERAPY TREATMENT NOTE
Outpatient Physical Therapy Ortho Treatment Note   [facility]     Patient Name: Jean Glynn  : 1980  MRN: [id number]  Today's Date: 2023      Visit Date: 2023    Visit Dx:    ICD-10-CM ICD-9-CM   1. Acute back pain with sciatica, right  M54.41 724.3   2. Acute low back pain with right-sided sciatica, unspecified back pain laterality  M54.41 724.2     724.3       There is no problem list on file for this patient.       Past Medical History:   Diagnosis Date   • Hyperlipidemia    • Hypertension    • Myocardial infarction (HCC)    • Sleep apnea         Past Surgical History:   Procedure Laterality Date   • ADENOIDECTOMY     • TONSILLECTOMY                          PT Assessment/Plan     Row Name 23 0758          PT Assessment    Assessment Comments Patient has made improvements in pain following traction.  However, we will need to focus further treatments on exercises to help patient self modulate his pain in spite of traction.  -FW        PT Plan    PT Plan Comments Continue POC  -FW           User Key  (r) = Recorded By, (t) = Taken By, (c) = Cosigned By    Initials Name Provider Type    FW Guille Delacruz, PT Physical Therapist                 Modalities     Row Name 23 0758             Subjective Pain    Post-Treatment Pain Level 1  -FW         Traction 46113    Traction Type Lumbar  -FW      PT Traction Rx Minutes 15  -FW      Duration Intermittent  -FW      Position Hook-lying  -FW      Weight 80  -FW      Hold 60  -FW      Relax 20  -FW            User Key  (r) = Recorded By, (t) = Taken By, (c) = Cosigned By    Initials Name Provider Type    FW Guille Delacruz PT Physical Therapist               OP Exercises     Row Name 23 0758             Subjective Comments    Subjective Comments Patient reports improvement in symptoms following traction during his last session.  He noted improved pain but continues with radiating pain down the right leg.  He noted compliance  with his HEP.  -FW         Subjective Pain    Able to rate subjective pain? yes  -FW      Pre-Treatment Pain Level 1  -FW      Post-Treatment Pain Level 1  -FW         Total Minutes    55332 - PT Therapeutic Exercise Minutes 10  -FW         Exercise 1    Exercise Name 1 LTR  -FW      Sets 1 2  -FW      Reps 1 15  -FW         Exercise 2    Exercise Name 2 bridge  -FW      Sets 2 2  -FW      Reps 2 10  -FW         Exercise 3    Exercise Name 3 supine sciatic slider in hooklying  -FW      Sets 3 2  -FW      Reps 3 15  -FW         Exercise 4    Exercise Name 4 prone press up  -FW      Sets 4 1  -FW      Reps 4 15  -FW         Exercise 5    Exercise Name 5 --  -FW            User Key  (r) = Recorded By, (t) = Taken By, (c) = Cosigned By    Initials Name Provider Type    FW Guille Delacruz, HOM[facility] Physical Therapist                                                Time Calculation:   Start Time: 0758  Timed Charges  93568 - PT Therapeutic Exercise Minutes: 10  Untimed Charges  PT Traction Rx Minutes: 15  Total Minutes  Timed Charges Total Minutes: 10  Untimed Charges Total Minutes: 15   Total Minutes: 25  Therapy Charges for Today     Code Description Service Date Service Provider Modifiers Qty    93402767490 HC PT THER PROC EA 15 MIN 2/13/2023 Guille Delacruz, PT GP 1    53702488983 HC PT TRACTION LUMBAR 2/13/2023 Guille Delacruz, PT GP 1                    Guille Delacruz PT  2/13/2023

## 2023-02-16 ENCOUNTER — HOSPITAL ENCOUNTER (OUTPATIENT)
Dept: PHYSICAL THERAPY | Facility: HOSPITAL | Age: 43
Setting detail: THERAPIES SERIES
Discharge: HOME | End: 2023-02-16
Payer: COMMERCIAL

## 2023-02-16 DIAGNOSIS — M54.41 ACUTE LOW BACK PAIN WITH RIGHT-SIDED SCIATICA, UNSPECIFIED BACK PAIN LATERALITY: ICD-10-CM

## 2023-02-16 DIAGNOSIS — M54.41 ACUTE BACK PAIN WITH SCIATICA, RIGHT: Primary | ICD-10-CM

## 2023-02-16 PROCEDURE — 97110 THERAPEUTIC EXERCISES: CPT

## 2023-02-16 PROCEDURE — 97012 MECHANICAL TRACTION THERAPY: CPT

## 2023-02-16 NOTE — THERAPY PROGRESS REPORT/RE-CERT
Outpatient Physical Therapy Ortho Progress Note  [facility]     Patient Name: Jean Glynn  : 1980  MRN: [id number]  Today's Date: 2023      Visit Date: 2023    Visit Dx:    ICD-10-CM ICD-9-CM   1. Acute back pain with sciatica, right  M54.41 724.3   2. Acute low back pain with right-sided sciatica, unspecified back pain laterality  M54.41 724.2     724.3       There is no problem list on file for this patient.       Past Medical History:   Diagnosis Date   • Hyperlipidemia    • Hypertension    • Myocardial infarction (HCC)    • Sleep apnea         Past Surgical History:   Procedure Laterality Date   • ADENOIDECTOMY     • TONSILLECTOMY          PT Ortho     Row Name 23 0800       Subjective Comments    Subjective Comments Patient reports no overall improvement since his initial evaluation as his pain is no longer debilitating.  Patient reports that his radiating pain is starting to centralize as is no longer in the foot and ankle which is in the mid to proximal calf when its at its worst.  However, today the patient's radiating pain is just down into the right buttock.  Patient noted that he has occasional numbness but that is improved as well.  He reports improve walking and overall function that is carried over for house chores and work duties.  He believes from the start of therapy he is at 80% and his prior level of function.  -FW       Subjective Pain    Able to rate subjective pain? yes  -FW    Pre-Treatment Pain Level 2  -FW    Post-Treatment Pain Level 1  -FW       Lumbar/SI Special Tests    Slump Test (Neural Tension) Positive;Right:  -FW    SLR (Neural Tension) Negative;Left:;Positive;Right:  Improved range of motion prior to exacerbation of symptoms with straight leg raise  -FW       MMT (Manual Muscle Testing)    Lt Lower Ext Lt Hip WFL;Lt Knee WFL;Lt Ankle WFL  -FW       MMT Right Lower Ext    Rt Knee Extension MMT, Gross Movement (5/5) normal  -FW    Rt Knee Flexion MMT,  Gross Movement (5/5) normal  -FW    Rt Ankle Dorsiflexion MMT, Gross Movement (5/5) normal  -FW          User Key  (r) = Recorded By, (t) = Taken By, (c) = Cosigned By    Initials Name Provider Type    FW Guille Delacruz, PT Physical Therapist                             PT Assessment/Plan     Row Name 02/16/23 0851          PT Assessment    Assessment Comments Patient has reported improvements in pain and overall function since initial evaluation.  Patient reports that his radiating pain down his leg is starting to centralize it is now moved proximal to his foot and ankle and at worst is in the calf muscle.  However, this date he reports radiating pain only into the right buttocks.  Patient noted that he continues with occasional numbness that is intermittent and started to become less frequent.  Patient reports improved walking and overall function and is carried over into his work duties and ADLs.  He reports that the pain that was debilitating has improved and now is more manageable.  Patient has demonstrated improvement in his modified Oswestry from 22% to 10% demonstrating improvement in self rated outcomes and functional measures.  Patient stated overall since the start of therapy he is at 80% of his prior level of function.  Patient will benefit from continued therapy services in order to remote independence with his HEP, gait on pain modulation, and to help return to full work and exercise duties without pain or difficulty.  Patient discussed weight loss and is interested in this in order to improve his overall health wellbeing and to help reduce spinal pressure/pain.  -FW        PT Plan    Predicted Duration of Therapy Intervention (PT) Continue PT for 2 to 4 weeks  -FW     PT Plan Comments Continue physical therapy skilled interventions in order to reduce pain and improve overall function and ADLs, work duties, and activities of leisure.  -FW           User Key  (r) = Recorded By, (t) = Taken By, (c) =  Cosigned By    Initials Name Provider Type    FW Guille Delacruz, PT Physical Therapist                 Modalities     Row Name 02/16/23 0800             Traction 41189    Traction Type Lumbar  -FW      PT Traction Rx Minutes 10  -FW      Duration Intermittent  -FW      Position Hook-lying  -FW      Weight 90  -FW      Hold 60  -FW      Relax 10  -FW            User Key  (r) = Recorded By, (t) = Taken By, (c) = Cosigned By    Initials Name Provider Type    FW Guille Delacruz PT Physical Therapist               OP Exercises     Row Name 02/16/23 0800             Subjective Comments    Subjective Comments Patient reports no overall improvement since his initial evaluation as his pain is no longer debilitating.  Patient reports that his radiating pain is starting to centralize as is no longer in the foot and ankle which is in the mid to proximal calf when its at its worst.  However, today the patient's radiating pain is just down into the right buttock.  Patient noted that he has occasional numbness but that is improved as well.  He reports improve walking and overall function that is carried over for house chores and work duties.  He believes from the start of therapy he is at 80% and his prior level of function.  -FW         Subjective Pain    Able to rate subjective pain? yes  -FW      Pre-Treatment Pain Level 2  -FW      Post-Treatment Pain Level 1  -FW         Total Minutes    49779 - PT Therapeutic Exercise Minutes 12  -FW         Exercise 1    Exercise Name 1 LTR  -FW      Sets 1 2  -FW      Reps 1 15  -FW         Exercise 2    Exercise Name 2 bridge  -FW      Sets 2 2  -FW      Reps 2 10  -FW         Exercise 3    Exercise Name 3 supine sciatic slider in hooklying  -FW      Sets 3 1  -FW      Reps 3 15  -FW         Exercise 4    Exercise Name 4 jamia curl up  -FW      Sets 4 2  -FW      Reps 4 10  -FW            User Key  (r) = Recorded By, (t) = Taken By, (c) = Cosigned By    Initials Name Provider  Type    FW Guille Delacruz, HOMAR Physical Therapist                              PT OP Goals     Row Name 02/16/23 0800          PT Short Term Goals    STG Date to Achieve 03/09/23  -FW     STG 1 He will report improvement in symptoms by 50% or more with ADLs and work duties  -FW     STG 1 Progress New  -FW     STG 2 Reported parents HEP program for optimal outcomes and POC  -FW     STG 2 Progress Met  -FW     STG 3 Patient will be able to return to the light to moderate exercise with pain equal less than or equal to 3 out of 10  -FW     STG 3 Progress Met  -FW        Long Term Goals    LTG Date to Achieve 03/03/23  -FW     LTG 1 Patient will report improvement in symptoms by 75% or more with all ADLs and associated work duties  -FW     LTG 1 Progress Ongoing  -FW     LTG 2 Patient will be able to sit for longer than 60 minutes and lay in all positions in bed without pain or discomfort  -FW     LTG 2 Progress Progressing;Ongoing  -FW     LTG 3 Patient will be able to return to all exercises moderate to heavy strain with pain less than or equal to 1 out of 10  -FW     LTG 3 Progress Ongoing  -        Time Calculation    PT Goal Re-Cert Due Date 03/18/23  -           User Key  (r) = Recorded By, (t) = Taken By, (c) = Cosigned By    Initials Name Provider Type    FW Guille Delacruz PT Physical Therapist                        Modified Oswestry  Modified Oswestry Score/Comments: 10%      Time Calculation:   Start Time: 0800  Timed Charges  47486 - PT Therapeutic Exercise Minutes: 12  Untimed Charges  PT Traction Rx Minutes: 10  Total Minutes  Timed Charges Total Minutes: 12  Untimed Charges Total Minutes: 10   Total Minutes: 22  Therapy Charges for Today     Code Description Service Date Service Provider Modifiers Qty    90676746823 HC PT THER PROC EA 15 MIN 2/16/2023 Guille Delacruz, PT GP 1    46373935939 HC PT TRACTION LUMBAR 2/16/2023 Guille Delacruz, PT GP 1          PT G-Codes  Modified Oswestry  Score/Comments: 10%         Guille Delacruz, PT  2/16/2023

## 2023-02-20 ENCOUNTER — HOSPITAL ENCOUNTER (OUTPATIENT)
Dept: PHYSICAL THERAPY | Facility: HOSPITAL | Age: 43
Setting detail: THERAPIES SERIES
Discharge: HOME | End: 2023-02-20
Payer: COMMERCIAL

## 2023-02-20 DIAGNOSIS — M54.41 ACUTE LOW BACK PAIN WITH RIGHT-SIDED SCIATICA, UNSPECIFIED BACK PAIN LATERALITY: ICD-10-CM

## 2023-02-20 DIAGNOSIS — M54.41 ACUTE BACK PAIN WITH SCIATICA, RIGHT: Primary | ICD-10-CM

## 2023-02-20 PROCEDURE — 97012 MECHANICAL TRACTION THERAPY: CPT

## 2023-02-20 PROCEDURE — 97110 THERAPEUTIC EXERCISES: CPT

## 2023-02-20 NOTE — THERAPY TREATMENT NOTE
Outpatient Physical Therapy Ortho Treatment Note  [facility]     Patient Name: Jean Glynn  : 1980  MRN: [id number]  Today's Date: 2023      Visit Date: 2023    Visit Dx:    ICD-10-CM ICD-9-CM   1. Acute back pain with sciatica, right  M54.41 724.3   2. Acute low back pain with right-sided sciatica, unspecified back pain laterality  M54.41 724.2     724.3       There is no problem list on file for this patient.       Past Medical History:   Diagnosis Date   • Hyperlipidemia    • Hypertension    • Myocardial infarction (HCC)    • Sleep apnea         Past Surgical History:   Procedure Laterality Date   • ADENOIDECTOMY     • TONSILLECTOMY                          PT Assessment/Plan     Row Name 23 0915          PT Assessment    Assessment Comments Patient continues to with improved pain with abduction and radiating symptoms.  Patient inquired about losing weight that would help his low back and is now interested in weight reduction.  Patient with 1 more additional visit and will continue to progress as indicated.  -FW        PT Plan    PT Plan Comments Continue POC  -FW           User Key  (r) = Recorded By, (t) = Taken By, (c) = Cosigned By    Initials Name Provider Type    FW Guille Delacruz, PT Physical Therapist                 Modalities     Row Name 23 0900             Traction 47856    Traction Type Lumbar  -FW      PT Traction Rx Minutes 10  -FW      Duration Intermittent  -FW      Position Hook-lying  -FW      Weight 90  -FW      Hold 60  -FW      Relax 20  -FW            User Key  (r) = Recorded By, (t) = Taken By, (c) = Cosigned By    Initials Name Provider Type    FW Guille Delacruz PT Physical Therapist               OP Exercises     Row Name 23 0858             Subjective Comments    Subjective Comments Patient reports continued improvement in pain with no radicular symptoms this date.  Patient noted that he was able to the fracture on  little pain in his  low back.  Patient noted that he is interested in losing weight helps with his low back and improved and overall healthy lifestyle  -FW         Subjective Pain    Able to rate subjective pain? yes  -FW      Pre-Treatment Pain Level 1  -FW      Post-Treatment Pain Level 0  -FW         Total Minutes    61619 - PT Therapeutic Exercise Minutes 14  -FW         Exercise 1    Exercise Name 1 LTR  -FW      Sets 1 1  -FW      Reps 1 10  -FW         Exercise 2    Exercise Name 2 bridge  -FW      Sets 2 2  -FW      Reps 2 10  -FW         Exercise 3    Exercise Name 3 nu-step  -FW      Reps 3 L5  -FW      Time 3 5 minutes  -FW         Exercise 4    Exercise Name 4 jamia curl up  -FW      Sets 4 1  -FW      Reps 4 10 each  -FW         Exercise 5    Exercise Name 5 bird dog  -FW      Sets 5 1  -FW      Reps 5 10  -FW            User Key  (r) = Recorded By, (t) = Taken By, (c) = Cosigned By    Initials Name Provider Type    FW Guille Delacruz, PT Physical Therapist                                                Time Calculation:   Start Time: 0845  Timed Charges  48107 - PT Therapeutic Exercise Minutes: 14  Untimed Charges  PT Traction Rx Minutes: 10  Total Minutes  Timed Charges Total Minutes: 14  Untimed Charges Total Minutes: 10   Total Minutes: 10  Therapy Charges for Today     Code Description Service Date Service Provider Modifiers Qty    32711365229 HC PT THER PROC EA 15 MIN 2/20/2023 Guille Delacruz, PT GP 1    76533496945 HC PT TRACTION LUMBAR 2/20/2023 Guille Delacruz, PT GP 1                    Guille Delacruz PT  2/20/2023

## 2023-02-27 ENCOUNTER — HOSPITAL ENCOUNTER (OUTPATIENT)
Dept: PHYSICAL THERAPY | Facility: HOSPITAL | Age: 43
Setting detail: THERAPIES SERIES
Discharge: HOME | End: 2023-02-27
Payer: COMMERCIAL

## 2023-02-27 DIAGNOSIS — M54.41 ACUTE BACK PAIN WITH SCIATICA, RIGHT: Primary | ICD-10-CM

## 2023-02-27 DIAGNOSIS — M54.41 ACUTE LOW BACK PAIN WITH RIGHT-SIDED SCIATICA, UNSPECIFIED BACK PAIN LATERALITY: ICD-10-CM

## 2023-02-27 PROCEDURE — 97012 MECHANICAL TRACTION THERAPY: CPT

## 2023-02-27 PROCEDURE — 97110 THERAPEUTIC EXERCISES: CPT

## 2023-02-27 NOTE — THERAPY DISCHARGE NOTE
Outpatient Physical Therapy Ortho Treatment Note/Discharge Summary   [place]     Patient Name: Jean Glynn  : 1980  MRN: [id number]  Today's Date: 2023      Visit Date: 2023    Visit Dx:    ICD-10-CM ICD-9-CM   1. Acute back pain with sciatica, right  M54.41 724.3   2. Acute low back pain with right-sided sciatica, unspecified back pain laterality  M54.41 724.2     724.3       There is no problem list on file for this patient.       Past Medical History:   Diagnosis Date   • Hyperlipidemia    • Hypertension    • Myocardial infarction (HCC)    • Sleep apnea         Past Surgical History:   Procedure Laterality Date   • ADENOIDECTOMY     • TONSILLECTOMY                          PT Assessment/Plan     Row Name 23 0823          PT Assessment    Assessment Comments Patient is demonstrating improvement with pain and function since his initial evaluation.  Patient has progressed well with exercise to help self modulate pain and responded well to traction throughout treatments.  Patient made improvement in  modified Oswestry from 22% to 4% indicating self reported improved pain and function. Patient reports that he is returning to exercise this week and follows up with his PCP this coming Friday.  Patient was instructed to continue with his HEP and to obtain another referral if he has any exacerbation with pain.  -FW        PT Plan    PT Plan Comments Discharge from PT caseload and continue with HEP  -FW           User Key  (r) = Recorded By, (t) = Taken By, (c) = Cosigned By    Initials Name Provider Type    FW Guille Delacruz, PT Physical Therapist                 Modalities     Row Name 23 0800             Traction 55163    Traction Type Lumbar  -FW      PT Traction Rx Minutes 12  -FW      Duration Intermittent  -FW      Position Hook-lying  -FW      Weight 90  -FW      Hold 60  -FW      Relax 20  -FW            User Key  (r) = Recorded By, (t) = Taken By, (c) = Cosigned By     Initials Name Provider Type     Guille Delacruz, PT Physical Therapist                 OP Exercises     Row Name 02/27/23 0800             Subjective Comments    Subjective Comments Patient reports that he was doing well over the last week until he had a minor exacerbation of pain after lifting up a bed frame over her shoulders.  Patient noted that he is progressed well with therapy thus far feels comfortable being discharged this date as he can manage with his current pain and mobility.  -FW         Subjective Pain    Able to rate subjective pain? yes  -FW      Pre-Treatment Pain Level 1  -FW      Post-Treatment Pain Level 0  -FW         Total Minutes    82663 - PT Therapeutic Exercise Minutes 10  -FW         Exercise 1    Exercise Name 1 LTR  -FW      Sets 1 1  -FW      Reps 1 10  -FW         Exercise 2    Exercise Name 2 bridge  -FW      Sets 2 2  -FW      Reps 2 10  -FW         Exercise 3    Exercise Name 3 jamia curl up  -FW      Sets 3 1  -FW      Reps 3 10 each  -FW         Exercise 4    Exercise Name 4 bird dog  -FW      Sets 4 2  -FW      Reps 4 10  -FW            User Key  (r) = Recorded By, (t) = Taken By, (c) = Cosigned By    Initials Name Provider Type    FW Guille Delacruz, PT Physical Therapist                                PT OP Goals     Row Name 02/27/23 0800          PT Short Term Goals    STG 1 He will report improvement in symptoms by 50% or more with ADLs and work duties  -FW     STG 1 Progress Met  -FW        Long Term Goals    LTG 1 Patient will report improvement in symptoms by 75% or more with all ADLs and associated work duties  -FW     LTG 1 Progress Met  -FW     LTG 2 Patient will be able to sit for longer than 60 minutes and lay in all positions in bed without pain or discomfort  -FW     LTG 2 Progress Met  -FW     LTG 3 Patient will be able to return to all exercises moderate to heavy strain with pain less than or equal to 1 out of 10  -FW     LTG 3 Progress  Ongoing;Progressing  -FW           User Key  (r) = Recorded By, (t) = Taken By, (c) = Cosigned By    Initials Name Provider Type    FW Guille Delacruz, PT Physical Therapist                     Outcome Measure Options: Modified Oswestry  Modified Oswestry  Modified Oswestry Score/Comments: 4%      Time Calculation:   Start Time: 0800  Timed Charges  98049 - PT Therapeutic Exercise Minutes: 10  Untimed Charges  PT Traction Rx Minutes: 12  Total Minutes  Timed Charges Total Minutes: 10  Untimed Charges Total Minutes: 12   Total Minutes: 22  Therapy Charges for Today     Code Description Service Date Service Provider Modifiers Qty    16885521734 HC PT THER PROC EA 15 MIN 2/27/2023 Guille Delacruz, PT GP 1    00660013926 HC PT TRACTION LUMBAR 2/27/2023 Guille Delacruz, PT GP 1          PT G-Codes  Outcome Measure Options: Modified Oswestry  Modified Oswestry Score/Comments: 4%     OP PT Discharge Summary  Date of Discharge: 02/27/23  Reason for Discharge: Independent  Outcomes Achieved: Patient able to partially acheive established goals  Discharge Instructions/Additional Comments: Patient is progressed well with therapy with minimal pain compared to his initial evaluation.  Patient is to be discharged from therapy caseload and continuing HEP.  Patient was instructed to obtain in a referral if he has an exacerbation in his symptoms.      Guille Delacruz PT  2/27/2023

## 2023-03-03 ENCOUNTER — OFFICE VISIT (OUTPATIENT)
Dept: INTERNAL MEDICINE | Facility: CLINIC | Age: 43
End: 2023-03-03
Payer: COMMERCIAL

## 2023-03-03 VITALS
OXYGEN SATURATION: 94 % | RESPIRATION RATE: 24 BRPM | HEIGHT: 71 IN | SYSTOLIC BLOOD PRESSURE: 138 MMHG | HEART RATE: 79 BPM | WEIGHT: 315 LBS | TEMPERATURE: 97 F | BODY MASS INDEX: 44.1 KG/M2 | DIASTOLIC BLOOD PRESSURE: 88 MMHG

## 2023-03-03 DIAGNOSIS — E11.65 TYPE 2 DIABETES MELLITUS WITH HYPERGLYCEMIA, UNSPECIFIED WHETHER LONG TERM INSULIN USE: Primary | ICD-10-CM

## 2023-03-03 DIAGNOSIS — E66.01 CLASS 3 SEVERE OBESITY WITH BODY MASS INDEX (BMI) OF 45.0 TO 49.9 IN ADULT, UNSPECIFIED OBESITY TYPE, UNSPECIFIED WHETHER SERIOUS COMORBIDITY PRESENT: ICD-10-CM

## 2023-03-03 DIAGNOSIS — M54.41 ACUTE BILATERAL LOW BACK PAIN WITH RIGHT-SIDED SCIATICA: ICD-10-CM

## 2023-03-03 PROCEDURE — 99214 OFFICE O/P EST MOD 30 MIN: CPT | Performed by: STUDENT IN AN ORGANIZED HEALTH CARE EDUCATION/TRAINING PROGRAM

## 2023-03-03 PROCEDURE — 3044F HG A1C LEVEL LT 7.0%: CPT | Performed by: STUDENT IN AN ORGANIZED HEALTH CARE EDUCATION/TRAINING PROGRAM

## 2023-03-03 RX ORDER — SEMAGLUTIDE 1.34 MG/ML
0.5 INJECTION, SOLUTION SUBCUTANEOUS WEEKLY
Qty: 1.5 ML | Refills: 2 | Status: SHIPPED | OUTPATIENT
Start: 2023-03-03 | End: 2023-03-03

## 2023-03-03 RX ORDER — SEMAGLUTIDE 1.34 MG/ML
0.5 INJECTION, SOLUTION SUBCUTANEOUS WEEKLY
Qty: 1.5 ML | Refills: 3 | Status: SHIPPED | OUTPATIENT
Start: 2023-03-03

## 2023-03-03 NOTE — PROGRESS NOTES
Office Note     Name: Jean Glynn    : 1980     MRN: [id number]     Chief Complaint  Back Pain (8 wk f/u-PT has helped and is now has finished pt. )    Subjective     History of Present Illness:  Jean Glynn is a 42 y.o. male who presents today for       follow up of low back problems. Current symptoms include: stiffness in lower back improve with physical therapy. Symptoms have improved from the previous visit. Exacerbating factors identified by the patient are bending backwards, bending forwards, sitting and standing. Patient is currently starting back at the gym, has been weight lifting and riding the recumbent back.        Type II DM  A1c 6.8 from prior labs  Never had been on diabetes medication  Prescribe metformin but patient has not started yet.          Review of Systems:   Review of Systems   All other systems reviewed and are negative.      Past Medical History:   Past Medical History:   Diagnosis Date   • Hyperlipidemia    • Hypertension    • Myocardial infarction (HCC)    • Sleep apnea        Past Surgical History:   Past Surgical History:   Procedure Laterality Date   • ADENOIDECTOMY     • TONSILLECTOMY         Family History:   Family History   Problem Relation Age of Onset   • Diabetes Paternal Uncle    • Cancer Paternal Grandmother        Social History:   Social History     Socioeconomic History   • Marital status: Single   Tobacco Use   • Smoking status: Never   • Smokeless tobacco: Current   Vaping Use   • Vaping Use: Every day   Substance and Sexual Activity   • Alcohol use: Not Currently   • Drug use: Defer   • Sexual activity: Never       Immunizations:   Immunization History   Administered Date(s) Administered   • COVID-19 (PFIZER) PURPLE CAP 01/15/2021, 2021   • FluLaval/Fluzone >6mos 2015   • Hepatitis B 2015   • Influenza, Unspecified 2015   • MMR 2015   • Tdap 2015   • Varicella 2015        Medications:     Current Outpatient Medications:  "  •  aspirin 81 MG EC tablet, Take 1 tablet by mouth Daily., Disp: , Rfl:   •  atorvastatin (LIPITOR) 80 MG tablet, Take 1 tablet by mouth Daily., Disp: , Rfl:   •  carvedilol (COREG) 25 MG tablet, Take 2 tablets by mouth 2 (Two) Times a Day With Meals., Disp: , Rfl:   •  cephalexin (KEFLEX) 500 MG capsule, Take 1 capsule by mouth 3 (Three) Times a Day., Disp: 30 capsule, Rfl: 0  •  clopidogrel (PLAVIX) 75 MG tablet, Take 1 tablet by mouth Daily., Disp: , Rfl:   •  losartan (COZAAR) 50 MG tablet, Take 1 tablet by mouth Daily., Disp: , Rfl:   •  metFORMIN ER (GLUCOPHAGE-XR) 500 MG 24 hr tablet, Take 2 tablets by mouth Daily With Breakfast. Please take for the first 7 days ,1 tablet of metformin 500 mg every morning.  Then increase to 2 tablets of metformin 500 mg daily., Disp: 90 tablet, Rfl: 1  •  naproxen (EC NAPROSYN) 500 MG EC tablet, Take 1 tablet by mouth 2 (Two) Times a Day As Needed (pain)., Disp: 30 tablet, Rfl: 0  •  Semaglutide,0.25 or 0.5MG/DOS, (Ozempic, 0.25 or 0.5 MG/DOSE,) 2 MG/1.5ML solution pen-injector, Inject 0.5 mg under the skin into the appropriate area as directed 1 (One) Time Per Week., Disp: 1.5 mL, Rfl: 3  •  tiZANidine (ZANAFLEX) 4 MG tablet, Take 1 tablet by mouth At Night As Needed for Muscle Spasms., Disp: 30 tablet, Rfl: 3  •  nitroglycerin (NITROSTAT) 0.4 MG SL tablet, Place 1 tablet under the tongue As Needed., Disp: , Rfl:   •  predniSONE (DELTASONE) 10 MG tablet, DAILY TAPER - 6/6/5/5/4/4/3/3/2/2/1/1 THEN D/C, Disp: 42 tablet, Rfl: 0    Allergies:   Allergies   Allergen Reactions   • Codeine Itching, Hives and Unknown (See Comments)   • Ace Inhibitors Other (See Comments), Rash and Unknown (See Comments)     Coughing, seizures  Seizures  Coughing, seizures       Objective     Vital Signs  /88 (Cuff Size: Large Adult)   Pulse 79   Temp 97 °F (36.1 °C) (Infrared)   Resp 24   Ht 180 cm (70.87\")   Wt (!) 162 kg (356 lb 14.4 oz)   SpO2 94%   BMI 49.96 kg/m²   Estimated body " "mass index is 49.96 kg/m² as calculated from the following:    Height as of this encounter: 180 cm (70.87\").    Weight as of this encounter: 162 kg (356 lb 14.4 oz).    Class 3 Severe Obesity (BMI >=40). Obesity-related health conditions include the following: diabetes mellitus. Obesity is unchanged. BMI is is above average; BMI management plan is completed. We discussed low calorie, low carb based diet program, portion control and increasing exercise.      Physical Exam  Constitutional:       Appearance: Normal appearance. He is obese.   Cardiovascular:      Rate and Rhythm: Normal rate and regular rhythm.      Pulses: Normal pulses.      Heart sounds: Normal heart sounds.   Pulmonary:      Effort: Pulmonary effort is normal.      Breath sounds: Normal breath sounds.   Musculoskeletal:      Lumbar back: Spasms and tenderness present. Decreased range of motion. Negative right straight leg raise test and negative left straight leg raise test.   Neurological:      Mental Status: He is alert.          Result Review :   The following data was reviewed by: Aakash Onofre MD on 03/03/2023:  Common labs    Common Labs 1/13/23 1/13/23 1/13/23 1/13/23    1108 1108 1108 1108   Glucose  110 (A)     BUN  18     Creatinine  0.94     Sodium  138     Potassium  4.3     Chloride  100     Calcium  9.2     Albumin  4.3     Total Bilirubin  0.4     Alkaline Phosphatase  70     AST (SGOT)  22     ALT (SGPT)  39     WBC 12.13 (A)      Hemoglobin 14.2      Hematocrit 42.2      Platelets 336      Total Cholesterol   183    Triglycerides   75    HDL Cholesterol   48    LDL Cholesterol    121 (A)    Hemoglobin A1C    6.70 (A)   (A) Abnormal value                       Assessment and Plan     1. Type 2 diabetes mellitus with hyperglycemia, unspecified whether long term insulin use (HCC)  Newly Diagnosed  Start metformin 500mg daily,    Continue diet and lifestyle modifications as prescribed. Encouraged patient to maintain a diabetic diet, " Increase lean protein and vegetable intake. Avoid sugary drinks and processed carbs including crackers, cookies, cakes. Recommend at least 30 minutes of exercise daily, at least 5 days per week. Increase exercise gradually. Blood glucose goal <150 fasting, <180 2 hr postprandial. Diabetic complications discussed. Annual eye examinations at Ophthalmology discussed, dental hygiene discussed and foot care reviewed., home glucose monitoring emphasized, all medications, side effects and compliance discussed carefully and Hypoglycemia management and prevention reviewed. Reviewed ‘ABCs’ of diabetes manageme    - Semaglutide,0.25 or 0.5MG/DOS, (Ozempic, 0.25 or 0.5 MG/DOSE,) 2 MG/1.5ML solution pen-injector; Inject 0.5 mg under the skin into the appropriate area as directed 1 (One) Time Per Week.  Dispense: 1.5 mL; Refill: 3    2. Class 3 severe obesity with body mass index (BMI) of 45.0 to 49.9 in adult, unspecified obesity type, unspecified whether serious comorbidity present (HCC)  Discussed risk associated with obesity. he was given instructions on calorie counting as well as on decreasing carbohydrate intake. he was also encouraged to start exercise regimen.  Instructed patient to download fitness joycelyn on his smart phone to aid in calorie counting and exercise tracking.      3. Acute bilateral low back pain with right-sided sciatica  improved  Continue with physical therapy and home exercises  OTC NSAID/Tylenol PRN for pain       Follow Up  Return in about 3 months (around 6/3/2023) for type II DM.    MD ZACH CanasE [address]  [facility]  [address]  [address] [address] [phone]

## 2023-05-04 DIAGNOSIS — E11.65 TYPE 2 DIABETES MELLITUS WITH HYPERGLYCEMIA, UNSPECIFIED WHETHER LONG TERM INSULIN USE: ICD-10-CM

## 2023-05-08 RX ORDER — SEMAGLUTIDE 1.34 MG/ML
0.5 INJECTION, SOLUTION SUBCUTANEOUS WEEKLY
Qty: 3 ML | Refills: 1 | Status: SHIPPED | OUTPATIENT
Start: 2023-05-08

## 2023-05-15 RX ORDER — METFORMIN HYDROCHLORIDE 500 MG/1
TABLET, EXTENDED RELEASE ORAL
Qty: 90 TABLET | Refills: 1 | Status: SHIPPED | OUTPATIENT
Start: 2023-05-15

## 2023-05-15 NOTE — TELEPHONE ENCOUNTER
LV: 03/03/2023  NV: 09/01/2023  A1C: 1/13/23-6.70    Dr. Onofre per last note he didn't start Metformin.

## 2023-07-24 DIAGNOSIS — E11.65 TYPE 2 DIABETES MELLITUS WITH HYPERGLYCEMIA, UNSPECIFIED WHETHER LONG TERM INSULIN USE: ICD-10-CM

## 2023-07-27 DIAGNOSIS — E11.65 TYPE 2 DIABETES MELLITUS WITH HYPERGLYCEMIA, UNSPECIFIED WHETHER LONG TERM INSULIN USE: ICD-10-CM

## 2023-07-27 RX ORDER — METFORMIN HYDROCHLORIDE 500 MG/1
TABLET, EXTENDED RELEASE ORAL
Qty: 90 TABLET | Refills: 0 | Status: SHIPPED | OUTPATIENT
Start: 2023-07-27

## 2023-07-28 RX ORDER — SEMAGLUTIDE 1.34 MG/ML
0.5 INJECTION, SOLUTION SUBCUTANEOUS WEEKLY
Qty: 3 ML | Refills: 1 | OUTPATIENT
Start: 2023-07-28

## 2023-07-28 RX ORDER — SEMAGLUTIDE 1.34 MG/ML
1 INJECTION, SOLUTION SUBCUTANEOUS WEEKLY
Qty: 4 PEN | Refills: 0 | Status: CANCELLED | OUTPATIENT
Start: 2023-07-28

## 2023-07-31 RX ORDER — SEMAGLUTIDE 1.34 MG/ML
0.5 INJECTION, SOLUTION SUBCUTANEOUS WEEKLY
Qty: 3 ML | Refills: 1 | Status: SHIPPED | OUTPATIENT
Start: 2023-07-31 | End: 2023-08-04

## 2023-08-04 RX ORDER — SEMAGLUTIDE 1.34 MG/ML
1 INJECTION, SOLUTION SUBCUTANEOUS WEEKLY
Qty: 3 ML | Refills: 2 | Status: SHIPPED | OUTPATIENT
Start: 2023-08-04

## 2023-08-21 ENCOUNTER — TELEPHONE (OUTPATIENT)
Dept: INTERNAL MEDICINE | Facility: CLINIC | Age: 43
End: 2023-08-21
Payer: COMMERCIAL

## 2023-08-21 RX ORDER — METFORMIN HYDROCHLORIDE 500 MG/1
TABLET, EXTENDED RELEASE ORAL
Qty: 90 TABLET | Refills: 0 | Status: CANCELLED | OUTPATIENT
Start: 2023-08-21

## 2023-08-21 NOTE — TELEPHONE ENCOUNTER
Caller: Jean Glynn    Relationship: Self    Best call back number: [phone]     Requested Prescriptions: METFORMIN 500MG       Pharmacy where request should be sent: [facility]     Last office visit with prescribing clinician: 3/3/2023   Last telemedicine visit with prescribing clinician: Visit date not found   Next office visit with prescribing clinician: 9/1/2023     Additional details provided by patient: PHARMACY SENT IN A REQUEST FOR REFILLS ONT HE METFORMIN. HE ALSO NEEDS A PRIOR AUTH ON THE OZEMPIC. PHARMACY IS WAITING ON US TO RESPOND TO THEIR FAXES FOR THE PRIOR AUTH.     Does the patient have less than a 3 day supply:  [x] Yes  [] No    Would you like a call back once the refill request has been completed: [] Yes [x] No    If the office needs to give you a call back, can they leave a voicemail: [] Yes [x] No    Pushpa Johnson, PCT   08/21/23 15:28 EDT

## 2023-08-22 RX ORDER — METFORMIN HYDROCHLORIDE 500 MG/1
1000 TABLET, EXTENDED RELEASE ORAL
Qty: 90 TABLET | Refills: 2 | Status: SHIPPED | OUTPATIENT
Start: 2023-08-22

## 2023-08-22 NOTE — TELEPHONE ENCOUNTER
OK FOR HUB TO RELAY MESSAGE BELOW    ATTEMPTED TO CALL PT LFT VM WITH CB NUMBER TO INFORM A PA WAS SENT FOR OZEMPIC

## 2023-09-01 ENCOUNTER — LAB (OUTPATIENT)
Dept: INTERNAL MEDICINE | Facility: CLINIC | Age: 43
End: 2023-09-01
Payer: COMMERCIAL

## 2023-09-01 ENCOUNTER — OFFICE VISIT (OUTPATIENT)
Dept: INTERNAL MEDICINE | Facility: CLINIC | Age: 43
End: 2023-09-01
Payer: COMMERCIAL

## 2023-09-01 VITALS
OXYGEN SATURATION: 96 % | BODY MASS INDEX: 44.1 KG/M2 | TEMPERATURE: 96 F | WEIGHT: 315 LBS | SYSTOLIC BLOOD PRESSURE: 118 MMHG | DIASTOLIC BLOOD PRESSURE: 90 MMHG | HEIGHT: 71 IN | HEART RATE: 80 BPM

## 2023-09-01 DIAGNOSIS — E11.65 TYPE 2 DIABETES MELLITUS WITH HYPERGLYCEMIA, UNSPECIFIED WHETHER LONG TERM INSULIN USE: Primary | ICD-10-CM

## 2023-09-01 DIAGNOSIS — I10 PRIMARY HYPERTENSION: ICD-10-CM

## 2023-09-01 DIAGNOSIS — E11.65 TYPE 2 DIABETES MELLITUS WITH HYPERGLYCEMIA, UNSPECIFIED WHETHER LONG TERM INSULIN USE: ICD-10-CM

## 2023-09-01 DIAGNOSIS — R45.89 DEPRESSED MOOD: ICD-10-CM

## 2023-09-01 DIAGNOSIS — G47.00 INSOMNIA, UNSPECIFIED TYPE: ICD-10-CM

## 2023-09-01 DIAGNOSIS — E66.01 CLASS 3 SEVERE OBESITY WITH BODY MASS INDEX (BMI) OF 45.0 TO 49.9 IN ADULT, UNSPECIFIED OBESITY TYPE, UNSPECIFIED WHETHER SERIOUS COMORBIDITY PRESENT: ICD-10-CM

## 2023-09-01 LAB
ALBUMIN UR-MCNC: 3.5 MG/DL
CREAT UR-MCNC: 105.8 MG/DL
HBA1C MFR BLD: 5.9 % (ref 4.8–5.6)
MICROALBUMIN/CREAT UR: 33.1 MG/G

## 2023-09-01 PROCEDURE — 82570 ASSAY OF URINE CREATININE: CPT | Performed by: STUDENT IN AN ORGANIZED HEALTH CARE EDUCATION/TRAINING PROGRAM

## 2023-09-01 PROCEDURE — 36415 COLL VENOUS BLD VENIPUNCTURE: CPT | Performed by: STUDENT IN AN ORGANIZED HEALTH CARE EDUCATION/TRAINING PROGRAM

## 2023-09-01 PROCEDURE — 83036 HEMOGLOBIN GLYCOSYLATED A1C: CPT | Performed by: STUDENT IN AN ORGANIZED HEALTH CARE EDUCATION/TRAINING PROGRAM

## 2023-09-01 PROCEDURE — 99214 OFFICE O/P EST MOD 30 MIN: CPT | Performed by: STUDENT IN AN ORGANIZED HEALTH CARE EDUCATION/TRAINING PROGRAM

## 2023-09-01 PROCEDURE — 82043 UR ALBUMIN QUANTITATIVE: CPT | Performed by: STUDENT IN AN ORGANIZED HEALTH CARE EDUCATION/TRAINING PROGRAM

## 2023-09-01 RX ORDER — TRAZODONE HYDROCHLORIDE 50 MG/1
50 TABLET ORAL NIGHTLY
Qty: 90 TABLET | Refills: 0 | Status: SHIPPED | OUTPATIENT
Start: 2023-09-01

## 2023-09-01 RX ORDER — SEMAGLUTIDE 1.34 MG/ML
1 INJECTION, SOLUTION SUBCUTANEOUS WEEKLY
Qty: 3 ML | Refills: 2 | Status: SHIPPED | OUTPATIENT
Start: 2023-09-01

## 2023-09-01 NOTE — PROGRESS NOTES
Office Note     Name: Jean Glynn    : 1980     MRN: [id number]     Chief Complaint  Obesity (Med f/u )    Subjective     History of Present Illness:  Jean Glynn is a 42 y.o. male who presents today for       Follow up for type II DM  Currently on metformin, he was taking ozempic, but has had issues getting his last prescription due to insurance reasons.    Recently his partner  about one month ago. Patient is sad over this, been having a hard time. He has been working to help keep his day normal.    Review of Systems:   Review of Systems    Past Medical History:   Past Medical History:   Diagnosis Date    Hyperlipidemia     Hypertension     Myocardial infarction     Sleep apnea        Past Surgical History:   Past Surgical History:   Procedure Laterality Date    ADENOIDECTOMY      TONSILLECTOMY         Family History:   Family History   Problem Relation Age of Onset    Diabetes Paternal Uncle     Cancer Paternal Grandmother        Social History:   Social History     Socioeconomic History    Marital status: Single   Tobacco Use    Smoking status: Never    Smokeless tobacco: Current   Vaping Use    Vaping Use: Every day   Substance and Sexual Activity    Alcohol use: Not Currently    Drug use: Defer    Sexual activity: Never       Immunizations:   Immunization History   Administered Date(s) Administered    COVID-19 (PFIZER) Purple Cap Monovalent 01/15/2021, 2021    Fluzone >6mos 2015    Hepatitis B Adult/Adolescent IM 2015    Influenza, Unspecified 2015    MMR 2015    Tdap 2015    Varicella 2015        Medications:     Current Outpatient Medications:     aspirin (ASPIR) 81 MG EC tablet, Take 1 tablet by mouth Daily., Disp: 90 tablet, Rfl: 3    atorvastatin (LIPITOR) 80 MG tablet, Take 1 tablet by mouth Daily., Disp: 90 tablet, Rfl: 3    carvedilol (COREG) 25 MG tablet, Take 2 tablets by mouth 2 (Two) Times a Day With Meals., Disp: , Rfl:     cephalexin (KEFLEX)  "500 MG capsule, Take 1 capsule by mouth 3 (Three) Times a Day., Disp: 30 capsule, Rfl: 0    clopidogrel (PLAVIX) 75 MG tablet, Take 1 tablet by mouth Daily., Disp: , Rfl:     losartan (COZAAR) 50 MG tablet, Take 1 tablet by mouth Daily., Disp: , Rfl:     metFORMIN ER (GLUCOPHAGE-XR) 500 MG 24 hr tablet, Take 2 tablets by mouth Daily With Breakfast., Disp: 90 tablet, Rfl: 2    naproxen (EC NAPROSYN) 500 MG EC tablet, Take 1 tablet by mouth 2 (Two) Times a Day As Needed (pain)., Disp: 30 tablet, Rfl: 0    nitroglycerin (NITROSTAT) 0.4 MG SL tablet, Place 1 tablet under the tongue As Needed. Take no more than 3 doses in 15 minutes. If pain persists call 911., Disp: 25 tablet, Rfl: 2    predniSONE (DELTASONE) 10 MG tablet, DAILY TAPER - 6/6/5/5/4/4/3/3/2/2/1/1 THEN D/C, Disp: 42 tablet, Rfl: 0    tiZANidine (ZANAFLEX) 4 MG tablet, Take 1 tablet by mouth At Night As Needed for Muscle Spasms., Disp: 30 tablet, Rfl: 3    nitroglycerin (NITROSTAT) 0.4 MG SL tablet, Place 1 tablet under the tongue As Needed., Disp: , Rfl:     Semaglutide, 1 MG/DOSE, (Ozempic, 1 MG/DOSE,) 4 MG/3ML solution pen-injector, Inject 1 mg under the skin into the appropriate area as directed 1 (One) Time Per Week., Disp: 3 mL, Rfl: 2    traZODone (DESYREL) 50 MG tablet, Take 1 tablet by mouth Every Night., Disp: 90 tablet, Rfl: 0    Allergies:   Allergies   Allergen Reactions    Codeine Itching, Hives and Unknown (See Comments)    Ace Inhibitors Other (See Comments), Rash and Unknown (See Comments)     Coughing, seizures  Seizures  Coughing, seizures       Objective     Vital Signs  /90   Pulse 80   Temp 96 °F (35.6 °C) (Temporal)   Ht 180 cm (70.87\")   Wt (!) 149 kg (328 lb 12.8 oz)   SpO2 96%   BMI 46.03 kg/m²   Estimated body mass index is 46.03 kg/m² as calculated from the following:    Height as of this encounter: 180 cm (70.87\").    Weight as of this encounter: 149 kg (328 lb 12.8 oz).            Physical Exam  Constitutional:       " Appearance: Normal appearance. He is obese.   Cardiovascular:      Rate and Rhythm: Normal rate and regular rhythm.      Pulses: Normal pulses.      Heart sounds: Normal heart sounds.   Pulmonary:      Effort: Pulmonary effort is normal.      Breath sounds: Normal breath sounds.   Neurological:      General: No focal deficit present.      Mental Status: He is alert and oriented to person, place, and time.   Psychiatric:         Attention and Perception: Attention normal.         Mood and Affect: Mood is depressed.         Speech: Speech normal.         Behavior: Behavior normal.        Result Review :   The following data was reviewed by: Aakash Onofre MD on 09/01/2023:  A1C Last 3 Results          1/13/2023    11:08 9/1/2023    12:08   HGBA1C Last 3 Results   Hemoglobin A1C 6.70  5.90               Results for orders placed or performed in visit on 01/13/23   Hepatitis C antibody    Specimen: Blood   Result Value Ref Range    Hepatitis C Ab Non-Reactive Non-Reactive       Assessment and Plan     1. Type 2 diabetes mellitus with hyperglycemia, unspecified whether long term insulin use  Improved, controlled at goal   A1c 5.9  - Microalbumin / Creatinine Urine Ratio - Urine, Clean Catch; Future  - Hemoglobin A1c; Future  - Semaglutide, 1 MG/DOSE, (Ozempic, 1 MG/DOSE,) 4 MG/3ML solution pen-injector; Inject 1 mg under the skin into the appropriate area as directed 1 (One) Time Per Week.  Dispense: 3 mL; Refill: 2    2. Class 3 severe obesity with body mass index (BMI) of 45.0 to 49.9 in adult, unspecified obesity type, unspecified whether serious comorbidity present  Discussed risk associated with obesity. he was given instructions on calorie counting as well as on decreasing carbohydrate intake. he was also encouraged to start exercise regimen.  Instructed patient to download fitness joycelyn on his smart phone to aid in calorie counting and exercise tracking.        3. Primary hypertension  Controlled:   Continue with  Coreg, 25mg, losartan    4. Insomnia, unspecified type    - traZODone (DESYREL) 50 MG tablet; Take 1 tablet by mouth Every Night.  Dispense: 90 tablet; Refill:     5. Depressed mood  Acute stress reaction due to recent death.  Discussed options to see therapist/behavioral health. Patient decliens today, can consider in the future.       Follow Up  No follow-ups on file.    Aakash Onofre MD  [facility] [address]  [facility]  [address]  [address] [address] [phone]

## 2023-09-13 RX ORDER — CLOPIDOGREL BISULFATE 75 MG/1
75 TABLET ORAL DAILY
Qty: 90 TABLET | Refills: 1 | Status: SHIPPED | OUTPATIENT
Start: 2023-09-13

## 2023-09-13 NOTE — TELEPHONE ENCOUNTER
Refill request clopidogrel 75mg   Last refill 11/15/21 by ER nurse   Last visit 9/1/23  Next visit 12/1/23

## 2023-09-14 RX ORDER — CARVEDILOL 25 MG/1
50 TABLET ORAL 2 TIMES DAILY WITH MEALS
OUTPATIENT
Start: 2023-09-14

## 2023-09-18 RX ORDER — CARVEDILOL 25 MG/1
25 TABLET ORAL 2 TIMES DAILY WITH MEALS
Qty: 120 TABLET | Refills: 6 | OUTPATIENT
Start: 2023-09-18

## 2023-11-01 RX ORDER — LOSARTAN POTASSIUM 50 MG/1
50 TABLET ORAL DAILY
Qty: 90 TABLET | Refills: 2 | Status: SHIPPED | OUTPATIENT
Start: 2023-11-01

## 2023-11-01 NOTE — TELEPHONE ENCOUNTER
REFILL REQUEST NOT ON MEDS LIST LOSARTAN 50 MG   UNKNOWN DATE AND UNKNOWN PROVIDER  LAST VISIT  9/1/23  LAST VISIT 12/1/23

## 2023-11-19 RX ORDER — CARVEDILOL 25 MG/1
50 TABLET ORAL 2 TIMES DAILY WITH MEALS
Qty: 360 TABLET | Refills: 0 | Status: SHIPPED | OUTPATIENT
Start: 2023-11-19

## 2023-12-01 ENCOUNTER — LAB (OUTPATIENT)
Dept: INTERNAL MEDICINE | Facility: CLINIC | Age: 43
End: 2023-12-01
Payer: COMMERCIAL

## 2023-12-01 ENCOUNTER — OFFICE VISIT (OUTPATIENT)
Dept: INTERNAL MEDICINE | Facility: CLINIC | Age: 43
End: 2023-12-01
Payer: COMMERCIAL

## 2023-12-01 VITALS
WEIGHT: 315 LBS | SYSTOLIC BLOOD PRESSURE: 144 MMHG | DIASTOLIC BLOOD PRESSURE: 90 MMHG | OXYGEN SATURATION: 98 % | TEMPERATURE: 96.4 F | HEART RATE: 78 BPM | BODY MASS INDEX: 44.1 KG/M2 | HEIGHT: 71 IN

## 2023-12-01 DIAGNOSIS — R53.83 OTHER FATIGUE: ICD-10-CM

## 2023-12-01 DIAGNOSIS — E11.65 TYPE 2 DIABETES MELLITUS WITH HYPERGLYCEMIA, UNSPECIFIED WHETHER LONG TERM INSULIN USE: ICD-10-CM

## 2023-12-01 DIAGNOSIS — I10 PRIMARY HYPERTENSION: Primary | ICD-10-CM

## 2023-12-01 LAB — HBA1C MFR BLD: 6 % (ref 4.8–5.6)

## 2023-12-01 PROCEDURE — 84402 ASSAY OF FREE TESTOSTERONE: CPT | Performed by: STUDENT IN AN ORGANIZED HEALTH CARE EDUCATION/TRAINING PROGRAM

## 2023-12-01 PROCEDURE — 83002 ASSAY OF GONADOTROPIN (LH): CPT | Performed by: STUDENT IN AN ORGANIZED HEALTH CARE EDUCATION/TRAINING PROGRAM

## 2023-12-01 PROCEDURE — 83036 HEMOGLOBIN GLYCOSYLATED A1C: CPT | Performed by: STUDENT IN AN ORGANIZED HEALTH CARE EDUCATION/TRAINING PROGRAM

## 2023-12-01 PROCEDURE — 84403 ASSAY OF TOTAL TESTOSTERONE: CPT | Performed by: STUDENT IN AN ORGANIZED HEALTH CARE EDUCATION/TRAINING PROGRAM

## 2023-12-01 PROCEDURE — 99214 OFFICE O/P EST MOD 30 MIN: CPT | Performed by: STUDENT IN AN ORGANIZED HEALTH CARE EDUCATION/TRAINING PROGRAM

## 2023-12-01 PROCEDURE — 36415 COLL VENOUS BLD VENIPUNCTURE: CPT | Performed by: STUDENT IN AN ORGANIZED HEALTH CARE EDUCATION/TRAINING PROGRAM

## 2023-12-01 PROCEDURE — 83001 ASSAY OF GONADOTROPIN (FSH): CPT | Performed by: STUDENT IN AN ORGANIZED HEALTH CARE EDUCATION/TRAINING PROGRAM

## 2023-12-01 RX ORDER — SEMAGLUTIDE 0.68 MG/ML
0.25 INJECTION, SOLUTION SUBCUTANEOUS WEEKLY
Qty: 3 ML | Refills: 0 | Status: SHIPPED | OUTPATIENT
Start: 2023-12-01

## 2023-12-01 NOTE — PROGRESS NOTES
Office Note     Name: Jean Glynn    : 1980     MRN: [id number]     Chief Complaint  Hypertension    Subjective     History of Present Illness:  Jean Glynn is a 43 y.o. male who presents today for       HTN/CAD  Currently on Coreg, losartan., Plavix  Sees cardiology next week.    Dyslipidemia  On atorvastatin 80mg daily       Follow up for type II DM  Currently on metformin, he was taking ozempic, but has had issues getting his last prescription due to insurance reasons.  Has not been able to fully start ozempic    Fatigue   Has been going to the gym lately due  to lose weight, finds that he get tired easily  His partner recently passed away and he still is struggling with this.       Review of Systems:   Review of Systems   All other systems reviewed and are negative.      Past Medical History:   Past Medical History:   Diagnosis Date    Hyperlipidemia     Hypertension     Myocardial infarction     Sleep apnea        Past Surgical History:   Past Surgical History:   Procedure Laterality Date    ADENOIDECTOMY      TONSILLECTOMY         Family History:   Family History   Problem Relation Age of Onset    Diabetes Paternal Uncle     Cancer Paternal Grandmother        Social History:   Social History     Socioeconomic History    Marital status: Single   Tobacco Use    Smoking status: Never    Smokeless tobacco: Current   Vaping Use    Vaping Use: Every day   Substance and Sexual Activity    Alcohol use: Not Currently    Drug use: Defer    Sexual activity: Never       Immunizations:   Immunization History   Administered Date(s) Administered    COVID-19 (PFIZER) Purple Cap Monovalent 01/15/2021, 2021    Fluzone (or Fluarix & Flulaval for VFC) >6mos 2015    Hepatitis B Adult/Adolescent IM 2015    Influenza, Unspecified 2015    MMR 2015    Tdap 2015    Varicella 2015        Medications:     Current Outpatient Medications:     aspirin (ASPIR) 81 MG EC tablet, Take 1 tablet  by mouth Daily., Disp: 90 tablet, Rfl: 3    atorvastatin (LIPITOR) 80 MG tablet, Take 1 tablet by mouth Daily., Disp: 90 tablet, Rfl: 3    carvedilol (COREG) 25 MG tablet, Take 2 tablets by mouth 2 (Two) Times a Day With Meals., Disp: 360 tablet, Rfl: 0    clopidogrel (PLAVIX) 75 MG tablet, Take 1 tablet by mouth Daily., Disp: 90 tablet, Rfl: 1    losartan (COZAAR) 50 MG tablet, Take 1 tablet by mouth Daily., Disp: 90 tablet, Rfl: 2    metFORMIN ER (GLUCOPHAGE-XR) 500 MG 24 hr tablet, Take 2 tablets by mouth Daily With Breakfast., Disp: 90 tablet, Rfl: 2    naproxen (EC NAPROSYN) 500 MG EC tablet, Take 1 tablet by mouth 2 (Two) Times a Day As Needed (pain)., Disp: 30 tablet, Rfl: 0    nitroglycerin (NITROSTAT) 0.4 MG SL tablet, Place 1 tablet under the tongue As Needed. Take no more than 3 doses in 15 minutes. If pain persists call 911., Disp: 25 tablet, Rfl: 2    predniSONE (DELTASONE) 10 MG tablet, DAILY TAPER - 6/6/5/5/4/4/3/3/2/2/1/1 THEN D/C, Disp: 42 tablet, Rfl: 0    tiZANidine (ZANAFLEX) 4 MG tablet, Take 1 tablet by mouth At Night As Needed for Muscle Spasms., Disp: 30 tablet, Rfl: 3    cephalexin (KEFLEX) 500 MG capsule, Take 1 capsule by mouth 3 (Three) Times a Day., Disp: 30 capsule, Rfl: 0    nitroglycerin (NITROSTAT) 0.4 MG SL tablet, Place 1 tablet under the tongue As Needed., Disp: , Rfl:     Semaglutide,0.25 or 0.5MG/DOS, (Ozempic, 0.25 or 0.5 MG/DOSE,) 2 MG/3ML solution pen-injector, Inject 0.25 mg under the skin into the appropriate area as directed 1 (One) Time Per Week., Disp: 3 mL, Rfl: 0    traZODone (DESYREL) 50 MG tablet, Take 1 tablet by mouth Every Night., Disp: 90 tablet, Rfl: 0    Allergies:   Allergies   Allergen Reactions    Codeine Itching, Hives and Unknown (See Comments)    Ace Inhibitors Other (See Comments), Rash and Unknown (See Comments)     Coughing, seizures  Seizures  Coughing, seizures       Objective     Vital Signs  /90   Pulse 78   Temp 96.4 °F (35.8 °C)  "(Temporal)   Ht 180 cm (70.87\")   Wt (!) 151 kg (333 lb 3.2 oz)   SpO2 98%   BMI 46.65 kg/m²   Estimated body mass index is 46.65 kg/m² as calculated from the following:    Height as of this encounter: 180 cm (70.87\").    Weight as of this encounter: 151 kg (333 lb 3.2 oz).            Physical Exam  Constitutional:       Appearance: Normal appearance. He is obese.   Cardiovascular:      Rate and Rhythm: Normal rate and regular rhythm.      Pulses: Normal pulses.      Heart sounds: Normal heart sounds.   Pulmonary:      Effort: Pulmonary effort is normal.      Breath sounds: Normal breath sounds.   Neurological:      Mental Status: He is alert.          Result Review :     Common labs          1/13/2023    11:08 9/1/2023    12:08 12/1/2023    12:17   Common Labs   Glucose 110      BUN 18      Creatinine 0.94      Sodium 138      Potassium 4.3      Chloride 100      Calcium 9.2      Albumin 4.3      Total Bilirubin 0.4      Alkaline Phosphatase 70      AST (SGOT) 22      ALT (SGPT) 39      WBC 12.13      Hemoglobin 14.2      Hematocrit 42.2      Platelets 336      Total Cholesterol 183      Triglycerides 75      HDL Cholesterol 48      LDL Cholesterol  121      Hemoglobin A1C 6.70  5.90  6.00    Microalbumin, Urine  3.5                      Assessment and Plan     1. Primary hypertension  Continue with Cozaar 50mg daily,     2. Type 2 diabetes mellitus with hyperglycemia, unspecified whether long term insulin use  A1c 6.00, controlled   Will resubmit ozempic to pharmacy  Continue with metformin  - Semaglutide,0.25 or 0.5MG/DOS, (Ozempic, 0.25 or 0.5 MG/DOSE,) 2 MG/3ML solution pen-injector; Inject 0.25 mg under the skin into the appropriate area as directed 1 (One) Time Per Week.  Dispense: 3 mL; Refill: 0  - Hemoglobin A1c; Future    3. Other fatigue    - Testosterone (Free & Total), LC / MS; Future  - FSH & LH; Future       Follow Up  No follow-ups on file.    Aakash Onofre MD  [facility] [address]  [employer] " [facility]  [address]  [facility] [address]  [phone]

## 2023-12-02 DIAGNOSIS — G47.00 INSOMNIA, UNSPECIFIED TYPE: ICD-10-CM

## 2023-12-02 LAB
FSH SERPL-ACNC: 4.1 MIU/ML
LH SERPL-ACNC: 12.2 MIU/ML

## 2023-12-06 RX ORDER — TRAZODONE HYDROCHLORIDE 50 MG/1
50 TABLET ORAL NIGHTLY
Qty: 90 TABLET | Refills: 0 | Status: SHIPPED | OUTPATIENT
Start: 2023-12-06

## 2023-12-07 LAB
TESTOST FREE SERPL-MCNC: 7.2 PG/ML (ref 6.8–21.5)
TESTOST SERPL-MCNC: 439.8 NG/DL (ref 264–916)

## 2023-12-30 RX ORDER — METFORMIN HYDROCHLORIDE 500 MG/1
1000 TABLET, EXTENDED RELEASE ORAL
Qty: 180 TABLET | Refills: 3 | Status: SHIPPED | OUTPATIENT
Start: 2023-12-30 | End: 2024-12-24

## 2024-03-12 DIAGNOSIS — G47.00 INSOMNIA, UNSPECIFIED TYPE: ICD-10-CM

## 2024-03-12 RX ORDER — CLOPIDOGREL BISULFATE 75 MG/1
75 TABLET ORAL DAILY
Qty: 30 TABLET | Refills: 0 | Status: SHIPPED | OUTPATIENT
Start: 2024-03-12

## 2024-03-12 RX ORDER — TRAZODONE HYDROCHLORIDE 50 MG/1
50 TABLET ORAL NIGHTLY
Qty: 30 TABLET | Refills: 0 | Status: SHIPPED | OUTPATIENT
Start: 2024-03-12

## 2024-03-12 RX ORDER — CARVEDILOL 25 MG/1
50 TABLET ORAL 2 TIMES DAILY WITH MEALS
Qty: 60 TABLET | Refills: 0 | Status: SHIPPED | OUTPATIENT
Start: 2024-03-12

## 2024-03-12 NOTE — TELEPHONE ENCOUNTER
----- Message from Jean Glynn sent at 3/12/2024 12:59 AM EDT -----  Regarding: Medication refills   Contact: [phone]    Im out       carvedilol 25 MG tablet    Commonly known as: COREG  Learn more  Take 2 tablets by mouth 2 (Two) Times a Day With Meals.  This prescription cannot be refilled through [facility] at this time.    No refills remaining.

## 2024-03-12 NOTE — TELEPHONE ENCOUNTER
LV: 12/01/223  Not scheduled  LF: 12/30/23 for 3 months.  Patient is due for a 3 month f/u  Sent in medications for a 30 day full to give him time to make an appointment.  PN by my chart message.

## 2024-03-13 ENCOUNTER — PRIOR AUTHORIZATION (OUTPATIENT)
Dept: INTERNAL MEDICINE | Facility: CLINIC | Age: 44
End: 2024-03-13
Payer: COMMERCIAL

## 2024-03-13 NOTE — TELEPHONE ENCOUNTER
PA submitted on ozempic.  KEY:  [id number]   Coverage Starts on: 3/12/2024 8:00 PM, Coverage Ends on: 3/12/2025    Pharmacy has been notified and patient by my chart message.  Copay is still 142.00.  he may look online for a copay card.

## 2024-04-11 ENCOUNTER — OFFICE VISIT (OUTPATIENT)
Dept: INTERNAL MEDICINE | Facility: CLINIC | Age: 44
End: 2024-04-11
Payer: COMMERCIAL

## 2024-04-11 VITALS
BODY MASS INDEX: 44.1 KG/M2 | DIASTOLIC BLOOD PRESSURE: 100 MMHG | HEIGHT: 71 IN | WEIGHT: 315 LBS | OXYGEN SATURATION: 97 % | TEMPERATURE: 97.1 F | SYSTOLIC BLOOD PRESSURE: 164 MMHG | HEART RATE: 78 BPM

## 2024-04-11 DIAGNOSIS — G47.00 INSOMNIA, UNSPECIFIED TYPE: ICD-10-CM

## 2024-04-11 DIAGNOSIS — I10 PRIMARY HYPERTENSION: ICD-10-CM

## 2024-04-11 DIAGNOSIS — E11.65 TYPE 2 DIABETES MELLITUS WITH HYPERGLYCEMIA, UNSPECIFIED WHETHER LONG TERM INSULIN USE: Primary | ICD-10-CM

## 2024-04-11 DIAGNOSIS — I25.10 ASCVD (ARTERIOSCLEROTIC CARDIOVASCULAR DISEASE): ICD-10-CM

## 2024-04-11 PROCEDURE — 99214 OFFICE O/P EST MOD 30 MIN: CPT | Performed by: STUDENT IN AN ORGANIZED HEALTH CARE EDUCATION/TRAINING PROGRAM

## 2024-04-11 RX ORDER — CARVEDILOL 25 MG/1
50 TABLET ORAL 2 TIMES DAILY WITH MEALS
Qty: 60 TABLET | Refills: 0 | Status: SHIPPED | OUTPATIENT
Start: 2024-04-11

## 2024-04-11 RX ORDER — TRAZODONE HYDROCHLORIDE 100 MG/1
100 TABLET ORAL NIGHTLY
Qty: 30 TABLET | Refills: 2 | Status: SHIPPED | OUTPATIENT
Start: 2024-04-11

## 2024-04-11 RX ORDER — SEMAGLUTIDE 0.68 MG/ML
0.5 INJECTION, SOLUTION SUBCUTANEOUS WEEKLY
Qty: 3 ML | Refills: 0 | Status: SHIPPED | OUTPATIENT
Start: 2024-04-11

## 2024-04-11 NOTE — PROGRESS NOTES
Office Note     Name: Jean Glynn    : 1980     MRN: [id number]     Chief Complaint  Hypertension, Med Refill, and Insomnia (Needs increase in meds )    Subjective     History of Present Illness:  Jean Glynn is a 43 y.o. male who presents today for     HTN  Currently on Losartan and Carvedilol  Did not take his dose yet today    ASCVD  Chronic condition, followed by cardiology  Currently on ASA/statin  No CP, no dizziness, no dyspnea    Type II DM  Currently on meformin  Started ozempic, has been on the 0.25mg dose.      Review of Systems:   Review of Systems   All other systems reviewed and are negative.      Past Medical History:   Past Medical History:   Diagnosis Date    Hyperlipidemia     Hypertension     Myocardial infarction     Sleep apnea        Past Surgical History:   Past Surgical History:   Procedure Laterality Date    ADENOIDECTOMY      TONSILLECTOMY         Family History:   Family History   Problem Relation Age of Onset    Diabetes Paternal Uncle     Cancer Paternal Grandmother        Social History:   Social History     Socioeconomic History    Marital status: Single   Tobacco Use    Smoking status: Never    Smokeless tobacco: Current   Vaping Use    Vaping status: Every Day   Substance and Sexual Activity    Alcohol use: Not Currently    Drug use: Defer    Sexual activity: Never       Immunizations:   Immunization History   Administered Date(s) Administered    COVID-19 (PFIZER) Purple Cap Monovalent 01/15/2021, 2021    Flublok 18+yrs 10/04/2023    Fluzone (or Fluarix & Flulaval for VFC) >6mos 2015    Hepatitis B Adult/Adolescent IM 2015    Influenza, Unspecified 2015    MMR 2015    Tdap 2015    Varicella 2015        Medications:     Current Outpatient Medications:     aspirin (ASPIR) 81 MG EC tablet, Take 1 tablet by mouth Daily., Disp: 90 tablet, Rfl: 3    atorvastatin (LIPITOR) 80 MG tablet, Take 1 tablet by mouth Daily., Disp: 90 tablet, Rfl:  "3    carvedilol (COREG) 25 MG tablet, Take 2 tablets by mouth 2 (Two) Times a Day With Meals., Disp: 60 tablet, Rfl: 0    clopidogrel (PLAVIX) 75 MG tablet, Take 1 tablet by mouth Daily., Disp: 30 tablet, Rfl: 0    losartan (COZAAR) 50 MG tablet, Take 1 tablet by mouth Daily., Disp: 90 tablet, Rfl: 2    metFORMIN ER (GLUCOPHAGE-XR) 500 MG 24 hr tablet, Take 2 tablets by mouth Daily With Breakfast., Disp: 180 tablet, Rfl: 3    naproxen (EC NAPROSYN) 500 MG EC tablet, Take 1 tablet by mouth 2 (Two) Times a Day As Needed (pain)., Disp: 30 tablet, Rfl: 0    nitroglycerin (NITROSTAT) 0.4 MG SL tablet, Place 1 tablet under the tongue As Needed. Take no more than 3 doses in 15 minutes. If pain persists call 911., Disp: 25 tablet, Rfl: 2    predniSONE (DELTASONE) 10 MG tablet, DAILY TAPER - 6/6/5/5/4/4/3/3/2/2/1/1 THEN D/C, Disp: 42 tablet, Rfl: 0    tiZANidine (ZANAFLEX) 4 MG tablet, Take 1 tablet by mouth At Night As Needed for Muscle Spasms., Disp: 30 tablet, Rfl: 3    cephalexin (KEFLEX) 500 MG capsule, Take 1 capsule by mouth 3 (Three) Times a Day., Disp: 30 capsule, Rfl: 0    nitroglycerin (NITROSTAT) 0.4 MG SL tablet, Place 1 tablet under the tongue As Needed., Disp: , Rfl:     Semaglutide,0.25 or 0.5MG/DOS, (Ozempic, 0.25 or 0.5 MG/DOSE,) 2 MG/3ML solution pen-injector, Inject 0.5 mg under the skin into the appropriate area as directed 1 (One) Time Per Week., Disp: 3 mL, Rfl: 0    traZODone (DESYREL) 100 MG tablet, Take 1 tablet by mouth Every Night., Disp: 30 tablet, Rfl: 2    Allergies:   Allergies   Allergen Reactions    Codeine Itching, Hives and Unknown (See Comments)    Ace Inhibitors Other (See Comments), Rash and Unknown (See Comments)     Coughing, seizures  Seizures  Coughing, seizures       Objective     Vital Signs  /100   Pulse 78   Temp 97.1 °F (36.2 °C) (Temporal)   Ht 180 cm (70.87\")   Wt (!) 152 kg (335 lb 3.2 oz)   SpO2 97%   BMI 46.93 kg/m²   Estimated body mass index is 46.93 kg/m² as " "calculated from the following:    Height as of this encounter: 180 cm (70.87\").    Weight as of this encounter: 152 kg (335 lb 3.2 oz).            Physical Exam  Constitutional:       Appearance: Normal appearance. He is obese.   Cardiovascular:      Rate and Rhythm: Normal rate and regular rhythm.      Pulses: Normal pulses.      Heart sounds: Normal heart sounds.   Pulmonary:      Effort: Pulmonary effort is normal.      Breath sounds: Normal breath sounds.   Neurological:      Mental Status: He is alert.          Result Review :     A1C Last 3 Results          9/1/2023    12:08 12/1/2023    12:17   HGBA1C Last 3 Results   Hemoglobin A1C 5.90  6.00               .    Assessment and Plan     1. Type 2 diabetes mellitus with hyperglycemia, unspecified whether long term insulin use  Controlled,  Continue with metformin, ozempic  Recheck A1c    - Semaglutide,0.25 or 0.5MG/DOS, (Ozempic, 0.25 or 0.5 MG/DOSE,) 2 MG/3ML solution pen-injector; Inject 0.5 mg under the skin into the appropriate area as directed 1 (One) Time Per Week.  Dispense: 3 mL; Refill: 0  - Hemoglobin A1c; Future  - Lipid panel; Future  - MicroAlbumin, Urine, Random - Urine, Clean Catch; Future    2. Insomnia, unspecified type  Med refilled  - traZODone (DESYREL) 100 MG tablet; Take 1 tablet by mouth Every Night.  Dispense: 30 tablet; Refill: 2    3. Primary hypertension  Elevated in office, maximize losartan 50mg BID dosing instead of once  Continue coreg.    4. ASCVD (arteriosclerotic cardiovascular disease)  Stable  On ASA/Statin  Followed by cardiology.       Follow Up  Return in about 3 months (around 7/11/2024) for Annual.    Aakash Onofre MD  [facility]  [facility]  [address] [facility]  [address] [address] [phone]    "

## 2024-04-24 RX ORDER — CARVEDILOL 25 MG/1
50 TABLET ORAL 2 TIMES DAILY WITH MEALS
Qty: 60 TABLET | Refills: 0 | OUTPATIENT
Start: 2024-04-24

## 2024-05-10 RX ORDER — CARVEDILOL 25 MG/1
50 TABLET ORAL 2 TIMES DAILY WITH MEALS
Qty: 60 TABLET | Refills: 2 | Status: SHIPPED | OUTPATIENT
Start: 2024-05-10

## 2024-05-20 DIAGNOSIS — E11.65 TYPE 2 DIABETES MELLITUS WITH HYPERGLYCEMIA, UNSPECIFIED WHETHER LONG TERM INSULIN USE: ICD-10-CM

## 2024-05-20 NOTE — TELEPHONE ENCOUNTER
Patient has been notified he needs to get his fasting labs drawn.  Can you send in a 30 day fill of the atorvastatin until he can get these done.  LV: 4/11/24  NV: 7/12/24  LL: 1/13/23  LF: 4/13/23 90/3 by another provider  LA1C: 12/1/23  LF: ozempic 4/11/24.

## 2024-05-22 RX ORDER — ATORVASTATIN CALCIUM 80 MG/1
80 TABLET, FILM COATED ORAL DAILY
Qty: 30 TABLET | Refills: 0 | Status: SHIPPED | OUTPATIENT
Start: 2024-05-22

## 2024-05-22 RX ORDER — SEMAGLUTIDE 0.68 MG/ML
0.5 INJECTION, SOLUTION SUBCUTANEOUS WEEKLY
Qty: 3 ML | Refills: 0 | Status: SHIPPED | OUTPATIENT
Start: 2024-05-22

## 2024-06-07 RX ORDER — CLOPIDOGREL BISULFATE 75 MG/1
75 TABLET ORAL DAILY
Qty: 30 TABLET | Refills: 0 | Status: SHIPPED | OUTPATIENT
Start: 2024-06-07

## 2024-06-07 NOTE — TELEPHONE ENCOUNTER
Pt states he found a previous bottle of medication he was taking and has been taking this medication. He states he moved recently and could not find the current bottle.     I asked patient if he was on both Liptor and Atorvastatin. He states he is currently on both. Last appt 12/01/2023. Please approve or deny.

## 2024-06-07 NOTE — TELEPHONE ENCOUNTER
"S/W pt to verify he is taking the medication. He states he \"will call the office back in 30 min.\"   Last medication requested was 3/12/2024 # 30 and 0 refills.   "

## 2024-06-15 DIAGNOSIS — E11.65 TYPE 2 DIABETES MELLITUS WITH HYPERGLYCEMIA, UNSPECIFIED WHETHER LONG TERM INSULIN USE: ICD-10-CM

## 2024-06-17 NOTE — TELEPHONE ENCOUNTER
Rx Refill Note  Requested Prescriptions     Pending Prescriptions Disp Refills    Semaglutide,0.25 or 0.5MG/DOS, (Ozempic, 0.25 or 0.5 MG/DOSE,) 2 MG/3ML solution pen-injector 3 mL 0     Sig: Inject 0.5 mg under the skin into the appropriate area as directed 1 (One) Time Per Week.      Last office visit with prescribing clinician: 4/11/2024   Last telemedicine visit with prescribing clinician: Visit date not found   Next office visit with prescribing clinician: 7/12/2024       Lupe Samayoa MA  06/17/24, 09:47 EDT

## 2024-06-18 RX ORDER — SEMAGLUTIDE 0.68 MG/ML
0.5 INJECTION, SOLUTION SUBCUTANEOUS WEEKLY
Qty: 3 ML | Refills: 0 | Status: SHIPPED | OUTPATIENT
Start: 2024-06-18

## 2024-06-21 RX ORDER — ATORVASTATIN CALCIUM 80 MG/1
80 TABLET, FILM COATED ORAL DAILY
Qty: 90 TABLET | Refills: 1 | Status: SHIPPED | OUTPATIENT
Start: 2024-06-21

## 2024-06-21 NOTE — TELEPHONE ENCOUNTER
Last Appt: 4/11/2024  Next Appt: 7/12/2024    Medication: Atorvastatin  Last refill: 5/22/2024  # of refills: 30 / 0    Pharmacy:   [facility]

## 2024-07-12 ENCOUNTER — OFFICE VISIT (OUTPATIENT)
Dept: INTERNAL MEDICINE | Facility: CLINIC | Age: 44
End: 2024-07-12
Payer: COMMERCIAL

## 2024-07-12 ENCOUNTER — LAB (OUTPATIENT)
Dept: INTERNAL MEDICINE | Facility: CLINIC | Age: 44
End: 2024-07-12
Payer: COMMERCIAL

## 2024-07-12 VITALS
SYSTOLIC BLOOD PRESSURE: 158 MMHG | BODY MASS INDEX: 44.1 KG/M2 | TEMPERATURE: 97.7 F | DIASTOLIC BLOOD PRESSURE: 88 MMHG | OXYGEN SATURATION: 97 % | RESPIRATION RATE: 14 BRPM | HEIGHT: 71 IN | WEIGHT: 315 LBS | HEART RATE: 76 BPM

## 2024-07-12 DIAGNOSIS — I25.83 CORONARY ARTERY DISEASE DUE TO LIPID RICH PLAQUE: Primary | ICD-10-CM

## 2024-07-12 DIAGNOSIS — G47.33 OSA (OBSTRUCTIVE SLEEP APNEA): ICD-10-CM

## 2024-07-12 DIAGNOSIS — I25.10 CORONARY ARTERY DISEASE DUE TO LIPID RICH PLAQUE: Primary | ICD-10-CM

## 2024-07-12 DIAGNOSIS — I10 HYPERTENSION, UNSPECIFIED TYPE: ICD-10-CM

## 2024-07-12 DIAGNOSIS — E11.65 TYPE 2 DIABETES MELLITUS WITH HYPERGLYCEMIA, UNSPECIFIED WHETHER LONG TERM INSULIN USE: ICD-10-CM

## 2024-07-12 DIAGNOSIS — G47.00 INSOMNIA, UNSPECIFIED TYPE: ICD-10-CM

## 2024-07-12 DIAGNOSIS — E66.01 CLASS 3 SEVERE OBESITY DUE TO EXCESS CALORIES WITH SERIOUS COMORBIDITY AND BODY MASS INDEX (BMI) OF 45.0 TO 49.9 IN ADULT: ICD-10-CM

## 2024-07-12 LAB
ALBUMIN UR-MCNC: 1.7 MG/DL
ANION GAP SERPL CALCULATED.3IONS-SCNC: 12.5 MMOL/L (ref 5–15)
BUN SERPL-MCNC: 10 MG/DL (ref 6–20)
BUN/CREAT SERPL: 10.5 (ref 7–25)
CALCIUM SPEC-SCNC: 9.4 MG/DL (ref 8.6–10.5)
CHLORIDE SERPL-SCNC: 102 MMOL/L (ref 98–107)
CHOLEST SERPL-MCNC: 137 MG/DL (ref 0–200)
CO2 SERPL-SCNC: 24.5 MMOL/L (ref 22–29)
CREAT SERPL-MCNC: 0.95 MG/DL (ref 0.76–1.27)
EGFRCR SERPLBLD CKD-EPI 2021: 101.2 ML/MIN/1.73
GLUCOSE SERPL-MCNC: 97 MG/DL (ref 65–99)
HBA1C MFR BLD: 6 % (ref 4.8–5.6)
HDLC SERPL-MCNC: 43 MG/DL (ref 40–60)
LDLC SERPL CALC-MCNC: 82 MG/DL (ref 0–100)
LDLC/HDLC SERPL: 1.94 {RATIO}
POTASSIUM SERPL-SCNC: 4.1 MMOL/L (ref 3.5–5.2)
SODIUM SERPL-SCNC: 139 MMOL/L (ref 136–145)
TRIGL SERPL-MCNC: 53 MG/DL (ref 0–150)
VLDLC SERPL-MCNC: 12 MG/DL (ref 5–40)

## 2024-07-12 PROCEDURE — 80048 BASIC METABOLIC PNL TOTAL CA: CPT | Performed by: STUDENT IN AN ORGANIZED HEALTH CARE EDUCATION/TRAINING PROGRAM

## 2024-07-12 PROCEDURE — 80061 LIPID PANEL: CPT | Performed by: STUDENT IN AN ORGANIZED HEALTH CARE EDUCATION/TRAINING PROGRAM

## 2024-07-12 PROCEDURE — 36415 COLL VENOUS BLD VENIPUNCTURE: CPT | Performed by: STUDENT IN AN ORGANIZED HEALTH CARE EDUCATION/TRAINING PROGRAM

## 2024-07-12 PROCEDURE — 99214 OFFICE O/P EST MOD 30 MIN: CPT | Performed by: STUDENT IN AN ORGANIZED HEALTH CARE EDUCATION/TRAINING PROGRAM

## 2024-07-12 PROCEDURE — 83036 HEMOGLOBIN GLYCOSYLATED A1C: CPT | Performed by: STUDENT IN AN ORGANIZED HEALTH CARE EDUCATION/TRAINING PROGRAM

## 2024-07-12 PROCEDURE — 82043 UR ALBUMIN QUANTITATIVE: CPT | Performed by: STUDENT IN AN ORGANIZED HEALTH CARE EDUCATION/TRAINING PROGRAM

## 2024-07-12 RX ORDER — SEMAGLUTIDE 1.34 MG/ML
1 INJECTION, SOLUTION SUBCUTANEOUS WEEKLY
Qty: 3 ML | Refills: 2 | Status: SHIPPED | OUTPATIENT
Start: 2024-07-12

## 2024-07-12 RX ORDER — SEMAGLUTIDE 0.68 MG/ML
0.5 INJECTION, SOLUTION SUBCUTANEOUS WEEKLY
Qty: 3 ML | Refills: 0 | Status: CANCELLED | OUTPATIENT
Start: 2024-07-12

## 2024-07-12 RX ORDER — LOSARTAN POTASSIUM 50 MG/1
50 TABLET ORAL DAILY
Qty: 90 TABLET | Refills: 2 | Status: CANCELLED | OUTPATIENT
Start: 2024-07-12

## 2024-07-12 RX ORDER — CARVEDILOL 25 MG/1
50 TABLET ORAL 2 TIMES DAILY WITH MEALS
Qty: 360 TABLET | Refills: 3 | Status: SHIPPED | OUTPATIENT
Start: 2024-07-12

## 2024-07-12 RX ORDER — TRAZODONE HYDROCHLORIDE 100 MG/1
100 TABLET ORAL NIGHTLY
Qty: 90 TABLET | Refills: 2 | Status: CANCELLED | OUTPATIENT
Start: 2024-07-12

## 2024-07-12 RX ORDER — LOSARTAN POTASSIUM AND HYDROCHLOROTHIAZIDE 12.5; 5 MG/1; MG/1
1 TABLET ORAL DAILY
Qty: 90 TABLET | Refills: 3 | Status: SHIPPED | OUTPATIENT
Start: 2024-07-12

## 2024-07-12 RX ORDER — CLOPIDOGREL BISULFATE 75 MG/1
75 TABLET ORAL DAILY
Qty: 90 TABLET | Refills: 3 | Status: SHIPPED | OUTPATIENT
Start: 2024-07-12

## 2024-07-12 NOTE — PROGRESS NOTES
Office Note     Name: Jean Glynn    : 1980     MRN: [id number]     Chief Complaint  Diabetes (3 month recheck )    Subjective     History of Present Illness:  Jean Glynn is a 43 y.o. male who presents today for     Type II DM  Currently on ozempic 0.5qweekly and metformin.   He is tolerating the medication well.     HTN  Currently on Losartan and Carvedilol  BP still not well controlled.      ASCVD  Chronic condition, needs   Currently on ASA/statin, coreg   No CP, no dizziness, no dyspnea      Would like referral to sleep specialist due to sleep apnea.     Review of Systems:   Review of Systems   All other systems reviewed and are negative.      Past Medical History:   Past Medical History:   Diagnosis Date   • Hyperlipidemia    • Hypertension    • Myocardial infarction    • Sleep apnea        Past Surgical History:   Past Surgical History:   Procedure Laterality Date   • ADENOIDECTOMY     • TONSILLECTOMY         Family History:   Family History   Problem Relation Age of Onset   • Diabetes Paternal Uncle    • Cancer Paternal Grandmother        Social History:   Social History     Socioeconomic History   • Marital status: Single   Tobacco Use   • Smoking status: Never   • Smokeless tobacco: Never   Vaping Use   • Vaping status: Every Day   Substance and Sexual Activity   • Alcohol use: Not Currently   • Drug use: Defer   • Sexual activity: Never       Immunizations:   Immunization History   Administered Date(s) Administered   • COVID-19 (PFIZER) Purple Cap Monovalent 01/15/2021, 2021   • Flublok 18+yrs 10/04/2023   • Fluzone (or Fluarix & Flulaval for VFC) >6mos 2015   • Hepatitis B Adult/Adolescent IM 2015   • Influenza, Unspecified 2015   • MMR 2015   • Tdap 2015   • Varicella 2015        Medications:     Current Outpatient Medications:   •  aspirin (ASPIR) 81 MG EC tablet, Take 1 tablet by mouth Daily., Disp: 90 tablet, Rfl: 3  •  atorvastatin (LIPITOR) 80 MG  "tablet, Take 1 tablet by mouth Daily., Disp: 90 tablet, Rfl: 1  •  carvedilol (COREG) 25 MG tablet, Take 2 tablets by mouth 2 (Two) Times a Day With Meals., Disp: 360 tablet, Rfl: 3  •  clopidogrel (PLAVIX) 75 MG tablet, Take 1 tablet by mouth Daily., Disp: 90 tablet, Rfl: 3  •  metFORMIN ER (GLUCOPHAGE-XR) 500 MG 24 hr tablet, Take 2 tablets by mouth Daily With Breakfast., Disp: 180 tablet, Rfl: 3  •  naproxen (EC NAPROSYN) 500 MG EC tablet, Take 1 tablet by mouth 2 (Two) Times a Day As Needed (pain)., Disp: 30 tablet, Rfl: 0  •  nitroglycerin (NITROSTAT) 0.4 MG SL tablet, Place 1 tablet under the tongue As Needed. Take no more than 3 doses in 15 minutes. If pain persists call 911., Disp: 25 tablet, Rfl: 2  •  tiZANidine (ZANAFLEX) 4 MG tablet, Take 1 tablet by mouth At Night As Needed for Muscle Spasms., Disp: 30 tablet, Rfl: 3  •  losartan-hydrochlorothiazide (HYZAAR) 50-12.5 MG per tablet, Take 1 tablet by mouth Daily., Disp: 90 tablet, Rfl: 3  •  Semaglutide, 1 MG/DOSE, (Ozempic, 1 MG/DOSE,) 4 MG/3ML solution pen-injector, Inject 1 mg under the skin into the appropriate area as directed 1 (One) Time Per Week., Disp: 3 mL, Rfl: 2    Allergies:   Allergies   Allergen Reactions   • Codeine Itching, Hives and Unknown (See Comments)   • Ace Inhibitors Other (See Comments), Rash and Unknown (See Comments)     Coughing, seizures  Seizures  Coughing, seizures       Objective     Vital Signs  /88   Pulse 76   Temp 97.7 °F (36.5 °C) (Infrared)   Resp 14   Ht 180 cm (70.87\")   Wt (!) 148 kg (327 lb)   SpO2 97%   BMI 45.78 kg/m²   Estimated body mass index is 45.78 kg/m² as calculated from the following:    Height as of this encounter: 180 cm (70.87\").    Weight as of this encounter: 148 kg (327 lb).            Physical Exam  Constitutional:       Appearance: Normal appearance. He is obese.   Cardiovascular:      Rate and Rhythm: Normal rate and regular rhythm.      Pulses: Normal pulses.      Heart sounds: " Normal heart sounds.   Pulmonary:      Effort: Pulmonary effort is normal.      Breath sounds: Normal breath sounds.   Neurological:      Mental Status: He is alert.          Result Review :     Common labs          9/1/2023    12:08 12/1/2023    12:17 7/12/2024    10:20   Common Labs   Glucose   97    BUN   10    Creatinine   0.95    Sodium   139    Potassium   4.1    Chloride   102    Calcium   9.4    Total Cholesterol   137    Triglycerides   53    HDL Cholesterol   43    LDL Cholesterol    82    Hemoglobin A1C 5.90  6.00  6.00    Microalbumin, Urine 3.5   1.7                 Assessment and Plan     1. Type 2 diabetes mellitus with hyperglycemia, unspecified whether long term insulin use  Continue with ozempic, increase dose to 1mg   His A1c has been well controlled, will d/c metformin    Continue diet and lifestyle modifications as prescribed. Encouraged patient to maintain a diabetic diet, Increase lean protein and vegetable intake. Avoid sugary drinks and processed carbs including crackers, cookies, cakes. Recommend at least 30 minutes of exercise daily, at least 5 days per week. Increase exercise gradually. Blood glucose goal <150 fasting, <180 2 hr postprandial. Diabetic complications discussed. Annual eye examinations at Ophthalmology discussed, dental hygiene discussed and foot care reviewed., home glucose monitoring emphasized, all medications, side effects and compliance discussed carefully and Hypoglycemia management and prevention reviewed. Reviewed ‘ABCs’ of diabetes manageme        - Semaglutide, 1 MG/DOSE, (Ozempic, 1 MG/DOSE,) 4 MG/3ML solution pen-injector; Inject 1 mg under the skin into the appropriate area as directed 1 (One) Time Per Week.  Dispense: 3 mL; Refill: 2  - Basic metabolic panel; Future  - MicroAlbumin, Urine, Random - Urine, Clean Catch; Future  - Ambulatory Referral for Diabetic Eye Exam-Optometry    2. Insomnia, unspecified type  D/C trazodone  Sleep hygiene discussed, patient  will use natural remedies    3. Coronary artery disease due to lipid rich plaque  Needs new cardiologist  He is asymptomatic   - carvedilol (COREG) 25 MG tablet; Take 2 tablets by mouth 2 (Two) Times a Day With Meals.  Dispense: 360 tablet; Refill: 3  - clopidogrel (PLAVIX) 75 MG tablet; Take 1 tablet by mouth Daily.  Dispense: 90 tablet; Refill: 3  - Semaglutide, 1 MG/DOSE, (Ozempic, 1 MG/DOSE,) 4 MG/3ML solution pen-injector; Inject 1 mg under the skin into the appropriate area as directed 1 (One) Time Per Week.  Dispense: 3 mL; Refill: 2  - Ambulatory Referral to Cardiology    4. JOHN (obstructive sleep apnea)    - Ambulatory Referral to Sleep Medicine    5. Hypertension, unspecified type  D/C losartan  Start hyzaar 50-12.5mg daily   - losartan-hydrochlorothiazide (HYZAAR) 50-12.5 MG per tablet; Take 1 tablet by mouth Daily.  Dispense: 90 tablet; Refill: 3    6. Class 3 severe obesity due to excess calories with serious comorbidity and body mass index (BMI) of 45.0 to 49.9 in adult  Discussed risk associated with obesity. he was given instructions on calorie counting as well as on decreasing carbohydrate intake. he was also encouraged to start exercise regimen.  Instructed patient to download fitness joycelyn on his smart phone to aid in calorie counting and exercise tracking.         Follow Up  Return in about 3 months (around 10/12/2024).    MD [facility]  [facility] PRIMARY [facility]ARE  [address]  [address] [address] [phone]

## 2024-07-14 RX ORDER — LOSARTAN POTASSIUM 50 MG/1
50 TABLET ORAL DAILY
Qty: 90 TABLET | Refills: 2 | Status: CANCELLED | OUTPATIENT
Start: 2024-07-14

## 2024-07-23 NOTE — TELEPHONE ENCOUNTER
REFILL REQUEST ASPIRIN OTHER PROVIDER   LAST REFILL 4/13/23  LAST VISIT 7/12/24  NEXT VISIT 10/18/24

## 2024-07-25 RX ORDER — ASPIRIN 81 MG/1
81 TABLET ORAL DAILY
Qty: 90 TABLET | Refills: 3 | Status: SHIPPED | OUTPATIENT
Start: 2024-07-25

## 2024-08-01 ENCOUNTER — OFFICE VISIT (OUTPATIENT)
Dept: CARDIOLOGY | Facility: CLINIC | Age: 44
End: 2024-08-01
Payer: COMMERCIAL

## 2024-08-01 VITALS
HEIGHT: 70 IN | SYSTOLIC BLOOD PRESSURE: 134 MMHG | BODY MASS INDEX: 45.1 KG/M2 | WEIGHT: 315 LBS | OXYGEN SATURATION: 96 % | DIASTOLIC BLOOD PRESSURE: 90 MMHG | HEART RATE: 74 BPM

## 2024-08-01 DIAGNOSIS — E78.2 MIXED HYPERLIPIDEMIA: ICD-10-CM

## 2024-08-01 DIAGNOSIS — I10 HYPERTENSION, UNSPECIFIED TYPE: ICD-10-CM

## 2024-08-01 DIAGNOSIS — I10 PRIMARY HYPERTENSION: ICD-10-CM

## 2024-08-01 DIAGNOSIS — I25.118 CORONARY ARTERY DISEASE OF NATIVE ARTERY OF NATIVE HEART WITH STABLE ANGINA PECTORIS: Primary | ICD-10-CM

## 2024-08-01 PROCEDURE — 99204 OFFICE O/P NEW MOD 45 MIN: CPT | Performed by: INTERNAL MEDICINE

## 2024-08-01 PROCEDURE — 93000 ELECTROCARDIOGRAM COMPLETE: CPT | Performed by: INTERNAL MEDICINE

## 2024-08-01 RX ORDER — MULTIPLE VITAMINS W/ MINERALS TAB 9MG-400MCG
1 TAB ORAL DAILY
COMMUNITY

## 2024-08-01 RX ORDER — LOSARTAN POTASSIUM AND HYDROCHLOROTHIAZIDE 25; 100 MG/1; MG/1
1 TABLET ORAL DAILY
Qty: 90 TABLET | Refills: 3 | Status: SHIPPED | OUTPATIENT
Start: 2024-08-01

## 2024-08-01 NOTE — PROGRESS NOTES
OFFICE VISIT  NOTE  [facility] CARDIOLOGY      Name: Jean Glynn Jr.    Date: 2024  MRN:  [id number]  :  1980      REFERRING/PRIMARY PROVIDER:  Aakash Onofre MD    Chief Complaint   Patient presents with    Coronary Artery Disease    Diabetes     Type 2    Hypertension    Sleep Apnea       HPI: Jean Glynn Jr. is a 43 y.o. male who presents for CAD.  Anterior STEMI at age 35 (8/15/2016, PCI of the LAD and circumflex, 2018 PCI to proximal RCA (4.0 JE postdilated with 4.5 NC) at .  History of hypertension, hyperlipidemia, diabetes mellitus, and obesity with a BMI 45.  Overall doing well no chest pain or shortness of breath, not exercising but was exercising 2024 without chest pain or shortness of breath.  Working on weight loss with diet and exercise.  Has been on Ozempic for the last month.    Past Medical History:   Diagnosis Date    Coronary artery disease     Hyperlipidemia     Hypertension     Myocardial infarction     Sleep apnea        Past Surgical History:   Procedure Laterality Date    ADENOIDECTOMY      TONSILLECTOMY         Social History     Socioeconomic History    Marital status: Single   Tobacco Use    Smoking status: Former     Current packs/day: 0.00     Average packs/day: 2.0 packs/day for 9.0 years (18.0 ttl pk-yrs)     Types: Cigarettes     Start date:      Quit date:      Years since quittin.6    Smokeless tobacco: Never   Vaping Use    Vaping status: Every Day    Start date: 2000   Substance and Sexual Activity    Alcohol use: Yes     Alcohol/week: 5.0 standard drinks of alcohol     Types: 5 Shots of liquor per week     Comment: occ    Drug use: Never    Sexual activity: Yes     Partners: Female     Birth control/protection: Condom, None       Family History   Problem Relation Age of Onset    No Known Problems Mother     No Known Problems Father     No Known Problems Sister     No Known Problems Brother     Diabetes  Paternal Uncle     Cancer Paternal Grandmother         ROS:   Constitutional no fever,  no weight loss   Skin no rash, no subcutaneous nodules   Otolaryngeal no difficulty swallowing   Cardiovascular See HPI   Pulmonary no cough, no sputum production   Gastrointestinal no constipation, no diarrhea   Genitourinary no dysuria, no hematuria   Hematologic no easy bruisability, no abnormal bleeding   Musculoskeletal no muscle pain   Neurologic no dizziness, no falls         Allergies   Allergen Reactions    Codeine Itching, Hives and Unknown (See Comments)    Ace Inhibitors Other (See Comments), Rash and Unknown (See Comments)     Coughing, seizures  Seizures  Coughing, seizures         Current Outpatient Medications:     aspirin (Aspirin Low Dose) 81 MG EC tablet, Take 1 tablet by mouth Daily., Disp: 90 tablet, Rfl: 3    atorvastatin (LIPITOR) 80 MG tablet, Take 1 tablet by mouth Daily., Disp: 90 tablet, Rfl: 1    carvedilol (COREG) 25 MG tablet, Take 2 tablets by mouth 2 (Two) Times a Day With Meals., Disp: 360 tablet, Rfl: 3    clopidogrel (PLAVIX) 75 MG tablet, Take 1 tablet by mouth Daily., Disp: 90 tablet, Rfl: 3    multivitamin with minerals tablet tablet, Take 1 tablet by mouth Daily., Disp: , Rfl:     Semaglutide, 1 MG/DOSE, (Ozempic, 1 MG/DOSE,) 4 MG/3ML solution pen-injector, Inject 1 mg under the skin into the appropriate area as directed 1 (One) Time Per Week. (Patient taking differently: Inject 0.75 mg under the skin into the appropriate area as directed 1 (One) Time Per Week.), Disp: 3 mL, Rfl: 2    losartan-hydrochlorothiazide (Hyzaar) 100-25 MG per tablet, Take 1 tablet by mouth Daily., Disp: 90 tablet, Rfl: 3    nitroglycerin (NITROSTAT) 0.4 MG SL tablet, Place 1 tablet under the tongue As Needed. Take no more than 3 doses in 15 minutes. If pain persists call 911. (Patient not taking: Reported on 8/1/2024), Disp: 25 tablet, Rfl: 2    Vitals:    08/01/24 1300   BP: 134/90   BP Location: Right arm  "  Patient Position: Sitting   Pulse: 74   SpO2: 96%   Weight: (!) 151 kg (332 lb)   Height: 177.8 cm (70\")     Body mass index is 47.64 kg/m².    PHYSICAL EXAM:    General Appearance:   well developed  well nourished  HENT:   oropharynx moist  lips not cyanotic  Neck:  thyroid not enlarged  supple  Respiratory:  no respiratory distress  normal breath sounds  no rales  Cardiovascular:  no jugular venous distention  regular rhythm  apical impulse normal  S1 normal, S2 normal  no S3, no S4   no murmur  no rub, no thrill  carotid pulses normal; no bruit  lower extremity edema: none      Musculoskeletal:  no clubbing of fingers.   normocephalic, head atraumatic  Skin:   warm, dry  Psychiatric:  judgement and insight appropriate  normal mood and affect    RESULTS:     ECG 12 Lead    Date/Time: 8/1/2024 1:37 PM  Performed by: Riley Brice MD    Authorized by: Riley Brice MD  Comparison: not compared with previous ECG   Rhythm: sinus rhythm  Ectopy: unifocal PVCs  Rate: normal  BPM: 74  QRS axis: normal    Clinical impression: non-specific ECG  Comments: Old anterior and inferior infarcts with 1 PVC noted                Labs:  Lab Results   Component Value Date    CHOL 137 07/12/2024    TRIG 53 07/12/2024    HDL 43 07/12/2024    LDL 82 07/12/2024    AST 22 01/13/2023    ALT 39 01/13/2023     Lab Results   Component Value Date    HGBA1C 6.00 (H) 07/12/2024     No components found for: \"CREATINININE\"  No results found for: \"EGFRIFNONA\"    Most recent PCP note, imaging tests, and labs reviewed.    ASSESSMENT:  Problem List Items Addressed This Visit       Coronary artery disease of native artery of native heart with stable angina pectoris - Primary    Primary hypertension    Relevant Medications    losartan-hydrochlorothiazide (Hyzaar) 100-25 MG per tablet    Mixed hyperlipidemia     Other Visit Diagnoses       Hypertension, unspecified type        Relevant Medications    losartan-hydrochlorothiazide (Hyzaar) 100-25 " MG per tablet            PLAN:    1.  CAD:  PCI of the LAD and circumflex 2016, PCI of the RCA 2018  Continue aspirin and statin indefinitely  Continue exercise, weight loss and diabetes management    Discussed no need for ongoing clopidogrel but at this time he would like to continue it and revisit at next appointment.    2.  Diabetes mellitus type 2:  Consider SGLT2 inhibitor  Continue GLP-1 agonist    3.  Obesity, BMI 45  Discussed importance of weight loss with interventions including GLP-1 agonist, exercise, and decrease caloric intake    4.  Hyperlipidemia:  Goal DL less than 55 given extensive premature CAD  Continue max dose atorvastatin, if not at goal in 3-6 months would add PCSK9 inhibitor    5.  Hypertension:  Goal blood pressure less than 130/80  Not at goal, double losartan/HCTZ new prescription sent  Low-sodium diet recommended  Continue carvedilol        Return to clinic in 9 months, or sooner as needed.    Thank you for the opportunity to share in the care of your patient; please do not hesitate to call me with any questions.     Riley Brice MD, [facility], [facility]  Office: [phone]  [facility] [place] [address]    08/01/24

## 2024-10-18 ENCOUNTER — LAB (OUTPATIENT)
Dept: INTERNAL MEDICINE | Facility: CLINIC | Age: 44
End: 2024-10-18
Payer: COMMERCIAL

## 2024-10-18 ENCOUNTER — OFFICE VISIT (OUTPATIENT)
Dept: INTERNAL MEDICINE | Facility: CLINIC | Age: 44
End: 2024-10-18
Payer: COMMERCIAL

## 2024-10-18 VITALS
TEMPERATURE: 97.7 F | DIASTOLIC BLOOD PRESSURE: 68 MMHG | SYSTOLIC BLOOD PRESSURE: 112 MMHG | HEIGHT: 70 IN | HEART RATE: 77 BPM | OXYGEN SATURATION: 96 % | BODY MASS INDEX: 45.1 KG/M2 | WEIGHT: 315 LBS

## 2024-10-18 DIAGNOSIS — I25.10 CORONARY ARTERY DISEASE DUE TO LIPID RICH PLAQUE: ICD-10-CM

## 2024-10-18 DIAGNOSIS — E78.2 MIXED HYPERLIPIDEMIA: ICD-10-CM

## 2024-10-18 DIAGNOSIS — I25.83 CORONARY ARTERY DISEASE DUE TO LIPID RICH PLAQUE: ICD-10-CM

## 2024-10-18 DIAGNOSIS — E11.65 TYPE 2 DIABETES MELLITUS WITH HYPERGLYCEMIA, UNSPECIFIED WHETHER LONG TERM INSULIN USE: Primary | ICD-10-CM

## 2024-10-18 DIAGNOSIS — I10 HYPERTENSION, UNSPECIFIED TYPE: ICD-10-CM

## 2024-10-18 DIAGNOSIS — E66.813 CLASS 3 SEVERE OBESITY DUE TO EXCESS CALORIES WITH SERIOUS COMORBIDITY AND BODY MASS INDEX (BMI) OF 45.0 TO 49.9 IN ADULT: ICD-10-CM

## 2024-10-18 DIAGNOSIS — E11.65 TYPE 2 DIABETES MELLITUS WITH HYPERGLYCEMIA, UNSPECIFIED WHETHER LONG TERM INSULIN USE: ICD-10-CM

## 2024-10-18 DIAGNOSIS — E66.01 CLASS 3 SEVERE OBESITY DUE TO EXCESS CALORIES WITH SERIOUS COMORBIDITY AND BODY MASS INDEX (BMI) OF 45.0 TO 49.9 IN ADULT: ICD-10-CM

## 2024-10-18 LAB
25(OH)D3 SERPL-MCNC: 10.7 NG/ML (ref 30–100)
ANION GAP SERPL CALCULATED.3IONS-SCNC: 10.3 MMOL/L (ref 5–15)
BUN SERPL-MCNC: 13 MG/DL (ref 6–20)
BUN/CREAT SERPL: 11.9 (ref 7–25)
CALCIUM SPEC-SCNC: 10.1 MG/DL (ref 8.6–10.5)
CHLORIDE SERPL-SCNC: 100 MMOL/L (ref 98–107)
CHOLEST SERPL-MCNC: 154 MG/DL (ref 0–200)
CO2 SERPL-SCNC: 26.7 MMOL/L (ref 22–29)
CREAT SERPL-MCNC: 1.09 MG/DL (ref 0.76–1.27)
DEPRECATED RDW RBC AUTO: 40 FL (ref 37–54)
EGFRCR SERPLBLD CKD-EPI 2021: 85.8 ML/MIN/1.73
ERYTHROCYTE [DISTWIDTH] IN BLOOD BY AUTOMATED COUNT: 12.2 % (ref 12.3–15.4)
EXPIRATION DATE: ABNORMAL
GLUCOSE SERPL-MCNC: 98 MG/DL (ref 65–99)
HBA1C MFR BLD: 5.8 % (ref 4.5–5.7)
HCT VFR BLD AUTO: 43.6 % (ref 37.5–51)
HDLC SERPL-MCNC: 37 MG/DL (ref 40–60)
HGB BLD-MCNC: 14.7 G/DL (ref 13–17.7)
LDLC SERPL CALC-MCNC: 103 MG/DL (ref 0–100)
LDLC/HDLC SERPL: 2.77 {RATIO}
Lab: ABNORMAL
MCH RBC QN AUTO: 30.4 PG (ref 26.6–33)
MCHC RBC AUTO-ENTMCNC: 33.7 G/DL (ref 31.5–35.7)
MCV RBC AUTO: 90.3 FL (ref 79–97)
PLATELET # BLD AUTO: 277 10*3/MM3 (ref 140–450)
PMV BLD AUTO: 10.6 FL (ref 6–12)
POTASSIUM SERPL-SCNC: 4.4 MMOL/L (ref 3.5–5.2)
RBC # BLD AUTO: 4.83 10*6/MM3 (ref 4.14–5.8)
SODIUM SERPL-SCNC: 137 MMOL/L (ref 136–145)
TRIGL SERPL-MCNC: 72 MG/DL (ref 0–150)
VLDLC SERPL-MCNC: 14 MG/DL (ref 5–40)
WBC NRBC COR # BLD AUTO: 6.55 10*3/MM3 (ref 3.4–10.8)

## 2024-10-18 PROCEDURE — 99214 OFFICE O/P EST MOD 30 MIN: CPT | Performed by: STUDENT IN AN ORGANIZED HEALTH CARE EDUCATION/TRAINING PROGRAM

## 2024-10-18 PROCEDURE — 36415 COLL VENOUS BLD VENIPUNCTURE: CPT | Performed by: STUDENT IN AN ORGANIZED HEALTH CARE EDUCATION/TRAINING PROGRAM

## 2024-10-18 PROCEDURE — 83036 HEMOGLOBIN GLYCOSYLATED A1C: CPT | Performed by: STUDENT IN AN ORGANIZED HEALTH CARE EDUCATION/TRAINING PROGRAM

## 2024-10-18 PROCEDURE — 80048 BASIC METABOLIC PNL TOTAL CA: CPT | Performed by: STUDENT IN AN ORGANIZED HEALTH CARE EDUCATION/TRAINING PROGRAM

## 2024-10-18 PROCEDURE — 85027 COMPLETE CBC AUTOMATED: CPT | Performed by: STUDENT IN AN ORGANIZED HEALTH CARE EDUCATION/TRAINING PROGRAM

## 2024-10-18 PROCEDURE — 80061 LIPID PANEL: CPT | Performed by: STUDENT IN AN ORGANIZED HEALTH CARE EDUCATION/TRAINING PROGRAM

## 2024-10-18 PROCEDURE — 82306 VITAMIN D 25 HYDROXY: CPT | Performed by: STUDENT IN AN ORGANIZED HEALTH CARE EDUCATION/TRAINING PROGRAM

## 2024-10-18 RX ORDER — SEMAGLUTIDE 2.68 MG/ML
2 INJECTION, SOLUTION SUBCUTANEOUS WEEKLY
Qty: 9 ML | Refills: 1 | Status: SHIPPED | OUTPATIENT
Start: 2024-10-18

## 2024-10-18 NOTE — PROGRESS NOTES
Office Note     Name: Jean Glynn Jr.    : 1980     MRN: [id number]     Chief Complaint  Diabetes and Hypertension    Subjective     History of Present Illness:  Jean Glynn Jr. is a 43 y.o. male who presents today for       Type II DM  Currently on ozempic 1mg qweekly,    He is tolerating the medication well.        CAD:   Anterior STEMI at age 35 (8/15/2016, PCI of the LAD and circumflex, 2018 PCI to proximal RCA (4.0 JE postdilated with 4.5 NC) at    He is on ASA and statin, tolerating the medication well  He is now following with [facility] cardiology.      Hyperlipidemia:  He is on lipitor 80 mg daily, he is tolerating the medication well.       Hypertension:  Blood pressure has  been well, he is on  losartan/HCTZ and coreg        Review of Systems:   Review of Systems   All other systems reviewed and are negative.      Past Medical History:   Past Medical History:   Diagnosis Date    Coronary artery disease     Hyperlipidemia     Hypertension     Myocardial infarction     Sleep apnea        Past Surgical History:   Past Surgical History:   Procedure Laterality Date    ADENOIDECTOMY      TONSILLECTOMY         Family History:   Family History   Problem Relation Age of Onset    No Known Problems Mother     No Known Problems Father     No Known Problems Sister     No Known Problems Brother     Diabetes Paternal Uncle     Cancer Paternal Grandmother        Social History:   Social History     Socioeconomic History    Marital status: Single   Tobacco Use    Smoking status: Former     Current packs/day: 0.00     Average packs/day: 2.0 packs/day for 9.0 years (18.0 ttl pk-yrs)     Types: Cigarettes     Start date:      Quit date: 2000     Years since quittin.8    Smokeless tobacco: Never   Vaping Use    Vaping status: Every Day    Start date: 2000    Substances: Nicotine    Devices: Pre-filled or refillable cartridge   Substance and Sexual Activity    Alcohol use: Yes      "Alcohol/week: 5.0 standard drinks of alcohol     Types: 5 Shots of liquor per week     Comment: occ    Drug use: Never    Sexual activity: Yes     Partners: Female     Birth control/protection: Condom, None       Immunizations:   Immunization History   Administered Date(s) Administered    COVID-19 (PFIZER) Purple Cap Monovalent 01/15/2021, 02/05/2021    Flublok 18+yrs 10/04/2023    Fluzone (or Fluarix & Flulaval for VFC) >6mos 09/25/2015    Hepatitis B Adult/Adolescent IM 09/25/2015    Influenza, Unspecified 09/25/2015    MMR 09/25/2015    Tdap 09/25/2015    Varicella 09/25/2015        Medications:     Current Outpatient Medications:     aspirin (Aspirin Low Dose) 81 MG EC tablet, Take 1 tablet by mouth Daily., Disp: 90 tablet, Rfl: 3    atorvastatin (LIPITOR) 80 MG tablet, Take 1 tablet by mouth Daily., Disp: 90 tablet, Rfl: 1    carvedilol (COREG) 25 MG tablet, Take 2 tablets by mouth 2 (Two) Times a Day With Meals., Disp: 360 tablet, Rfl: 3    clopidogrel (PLAVIX) 75 MG tablet, Take 1 tablet by mouth Daily., Disp: 90 tablet, Rfl: 3    losartan-hydrochlorothiazide (Hyzaar) 100-25 MG per tablet, Take 1 tablet by mouth Daily., Disp: 90 tablet, Rfl: 3    nitroglycerin (NITROSTAT) 0.4 MG SL tablet, Place 1 tablet under the tongue As Needed. Take no more than 3 doses in 15 minutes. If pain persists call 911., Disp: 25 tablet, Rfl: 2    Semaglutide, 2 MG/DOSE, (Ozempic, 2 MG/DOSE,) 8 MG/3ML solution pen-injector, Inject 2 mg under the skin into the appropriate area as directed 1 (One) Time Per Week., Disp: 9 mL, Rfl: 1    Allergies:   Allergies   Allergen Reactions    Codeine Itching, Hives and Unknown (See Comments)    Ace Inhibitors Other (See Comments), Rash and Unknown (See Comments)     Coughing, seizures  Seizures  Coughing, seizures       Objective     Vital Signs  /68   Pulse 77   Temp 97.7 °F (36.5 °C) (Temporal)   Ht 177.8 cm (70\")   Wt (!) 148 kg (326 lb 3.2 oz)   SpO2 96%   BMI 46.80 kg/m² " "  Estimated body mass index is 46.8 kg/m² as calculated from the following:    Height as of this encounter: 177.8 cm (70\").    Weight as of this encounter: 148 kg (326 lb 3.2 oz).            Physical Exam  Constitutional:       Appearance: Normal appearance. He is obese.   Cardiovascular:      Rate and Rhythm: Normal rate and regular rhythm.      Pulses: Normal pulses.      Heart sounds: Normal heart sounds.   Pulmonary:      Effort: Pulmonary effort is normal.      Breath sounds: Normal breath sounds.   Neurological:      Mental Status: He is alert.          Result Review :     Common labs          12/1/2023    12:17 7/12/2024    10:20 10/18/2024    10:41   Common Labs   Glucose  97     BUN  10     Creatinine  0.95     Sodium  139     Potassium  4.1     Chloride  102     Calcium  9.4     Total Cholesterol  137     Triglycerides  53     HDL Cholesterol  43     LDL Cholesterol   82     Hemoglobin A1C 6.00  6.00  5.8    Microalbumin, Urine  1.7                  Assessment and Plan     1. Type 2 diabetes mellitus with hyperglycemia, unspecified whether long term insulin use  A1c: 5.8    Continue diet and lifestyle modifications as prescribed. Encouraged patient to maintain a diabetic diet, Increase lean protein and vegetable intake. Avoid sugary drinks and processed carbs including crackers, cookies, cakes. Recommend at least 30 minutes of exercise daily, at least 5 days per week. Increase exercise gradually. Blood glucose goal <150 fasting, <180 2 hr postprandial. Diabetic complications discussed. Annual eye examinations at Ophthalmology discussed, dental hygiene discussed and foot care reviewed., home glucose monitoring emphasized, all medications, side effects and compliance discussed carefully and Hypoglycemia management and prevention reviewed. Reviewed ‘ABCs’ of diabetes manageme    - POC Glycosylated Hemoglobin (Hb A1C)  - Semaglutide, 2 MG/DOSE, (Ozempic, 2 MG/DOSE,) 8 MG/3ML solution pen-injector; Inject 2 mg " under the skin into the appropriate area as directed 1 (One) Time Per Week.  Dispense: 9 mL; Refill: 1  - Basic Metabolic Panel; Future  - CBC No Differential; Future  - Vitamin D,25-Hydroxy; Future    2. Coronary artery disease due to lipid rich plaque  On ASA and statin therapy  - Lipid panel; Future    3. Hypertension, unspecified type  Controlled today  Continue with Losartan/HCTZ and coreg    4. Class 3 severe obesity due to excess calories with serious comorbidity and body mass index (BMI) of 45.0 to 49.9 in adult  Discussed risk associated with obesity. he was given instructions on calorie counting as well as on decreasing carbohydrate intake. he was also encouraged to start exercise regimen.  Instructed patient to download fitness joycelyn on his smart phone to aid in calorie counting and exercise tracking.      5. Mixed hyperlipidemia  On statin therapy        Follow Up  Return in about 3 months (around 1/18/2025) for Recheck.    MD KAYLEY [facility] [address]  [facility]  [address]  [address] [id number] [phone]

## 2024-10-21 RX ORDER — ERGOCALCIFEROL 1.25 MG/1
50000 CAPSULE, LIQUID FILLED ORAL WEEKLY
Qty: 12 CAPSULE | Refills: 0 | Status: SHIPPED | OUTPATIENT
Start: 2024-10-21

## 2024-10-31 RX ORDER — ERGOCALCIFEROL 1.25 MG/1
50000 CAPSULE, LIQUID FILLED ORAL WEEKLY
Qty: 12 CAPSULE | Refills: 0 | Status: CANCELLED | OUTPATIENT
Start: 2024-10-31

## 2024-10-31 RX ORDER — ATORVASTATIN CALCIUM 80 MG/1
80 TABLET, FILM COATED ORAL DAILY
Qty: 90 TABLET | Refills: 1 | Status: CANCELLED | OUTPATIENT
Start: 2024-10-31

## 2024-10-31 NOTE — TELEPHONE ENCOUNTER
Refill request atorvastatin too soon to refill   Last refill 6/21/24    Vitamin d   Last refill 10/21/24  Last visit 10/18/24  Next visit 1/10/25    No

## 2025-01-10 ENCOUNTER — OFFICE VISIT (OUTPATIENT)
Dept: INTERNAL MEDICINE | Facility: CLINIC | Age: 45
End: 2025-01-10
Payer: COMMERCIAL

## 2025-01-10 VITALS
BODY MASS INDEX: 45.1 KG/M2 | WEIGHT: 315 LBS | TEMPERATURE: 97.8 F | HEIGHT: 70 IN | DIASTOLIC BLOOD PRESSURE: 60 MMHG | OXYGEN SATURATION: 96 % | SYSTOLIC BLOOD PRESSURE: 126 MMHG

## 2025-01-10 DIAGNOSIS — I25.83 CORONARY ARTERY DISEASE DUE TO LIPID RICH PLAQUE: ICD-10-CM

## 2025-01-10 DIAGNOSIS — E66.813 CLASS 3 SEVERE OBESITY DUE TO EXCESS CALORIES WITH SERIOUS COMORBIDITY AND BODY MASS INDEX (BMI) OF 45.0 TO 49.9 IN ADULT: ICD-10-CM

## 2025-01-10 DIAGNOSIS — K21.9 GASTROESOPHAGEAL REFLUX DISEASE, UNSPECIFIED WHETHER ESOPHAGITIS PRESENT: ICD-10-CM

## 2025-01-10 DIAGNOSIS — E66.01 CLASS 3 SEVERE OBESITY DUE TO EXCESS CALORIES WITH SERIOUS COMORBIDITY AND BODY MASS INDEX (BMI) OF 45.0 TO 49.9 IN ADULT: ICD-10-CM

## 2025-01-10 DIAGNOSIS — I25.10 CORONARY ARTERY DISEASE DUE TO LIPID RICH PLAQUE: ICD-10-CM

## 2025-01-10 DIAGNOSIS — E11.65 TYPE 2 DIABETES MELLITUS WITH HYPERGLYCEMIA, UNSPECIFIED WHETHER LONG TERM INSULIN USE: Primary | ICD-10-CM

## 2025-01-10 DIAGNOSIS — Z12.11 SCREENING FOR COLON CANCER: ICD-10-CM

## 2025-01-10 DIAGNOSIS — I10 HYPERTENSION, UNSPECIFIED TYPE: ICD-10-CM

## 2025-01-10 LAB
ALBUMIN UR-MCNC: <1.2 MG/DL
EXPIRATION DATE: NORMAL
HBA1C MFR BLD: 5.4 % (ref 4.5–5.7)
Lab: NORMAL

## 2025-01-10 PROCEDURE — 99214 OFFICE O/P EST MOD 30 MIN: CPT | Performed by: STUDENT IN AN ORGANIZED HEALTH CARE EDUCATION/TRAINING PROGRAM

## 2025-01-10 PROCEDURE — 83036 HEMOGLOBIN GLYCOSYLATED A1C: CPT | Performed by: STUDENT IN AN ORGANIZED HEALTH CARE EDUCATION/TRAINING PROGRAM

## 2025-01-10 PROCEDURE — 82043 UR ALBUMIN QUANTITATIVE: CPT | Performed by: STUDENT IN AN ORGANIZED HEALTH CARE EDUCATION/TRAINING PROGRAM

## 2025-01-10 RX ORDER — FAMOTIDINE 20 MG/1
20 TABLET, FILM COATED ORAL 2 TIMES DAILY
Qty: 60 TABLET | Refills: 2 | Status: SHIPPED | OUTPATIENT
Start: 2025-01-10

## 2025-01-10 RX ORDER — SEMAGLUTIDE 2.68 MG/ML
2 INJECTION, SOLUTION SUBCUTANEOUS WEEKLY
Qty: 9 ML | Refills: 3 | Status: SHIPPED | OUTPATIENT
Start: 2025-01-10

## 2025-01-10 NOTE — PROGRESS NOTES
Office Note     Name: Jean Glynn Jr.    : 1980     MRN: [id number]     Chief Complaint  Obesity    Subjective     History of Present Illness:  Jean Glynn Jr. is a 44 y.o. male who presents today for        Type II DM  Currently on ozempic 2mg qweekly, during Markie he did not take the medicine   He is tolerating the medication well.  He will occasionally get some reflux and bloating if he eats too much sweets.         CAD:  He has been asymptomatic   Anterior STEMI at age 35 (8/15/2016, PCI of the LAD and circumflex, 2018 PCI to proximal RCA (4.0 JE postdilated with 4.5 NC) at    He is on ASA and statin, tolerating the medication well  He is establishing care with a new cardiologist since his previous one left.     Hyperlipidemia:  He is on lipitor 80 mg daily, he is tolerating the medication well.         Hypertension:  Blood pressure has  been well, he is on  losartan/HCTZ and coreg            Review of Systems:   Review of Systems   All other systems reviewed and are negative.      Past Medical History:   Past Medical History:   Diagnosis Date    Coronary artery disease     Hyperlipidemia     Hypertension     Myocardial infarction     Sleep apnea        Past Surgical History:   Past Surgical History:   Procedure Laterality Date    ADENOIDECTOMY      TONSILLECTOMY         Family History:   Family History   Problem Relation Age of Onset    No Known Problems Mother     No Known Problems Father     No Known Problems Sister     No Known Problems Brother     Diabetes Paternal Uncle     Cancer Paternal Grandmother        Social History:   Social History     Socioeconomic History    Marital status: Single   Tobacco Use    Smoking status: Former     Current packs/day: 0.00     Average packs/day: 2.0 packs/day for 9.0 years (18.0 ttl pk-yrs)     Types: Cigarettes     Start date:      Quit date: 2000     Years since quittin.0    Smokeless tobacco: Never   Vaping Use    Vaping  status: Every Day    Start date: 1/1/2000    Substances: Nicotine    Devices: Pre-filled or refillable cartridge   Substance and Sexual Activity    Alcohol use: Yes     Alcohol/week: 5.0 standard drinks of alcohol     Types: 5 Shots of liquor per week     Comment: occ    Drug use: Never    Sexual activity: Yes     Partners: Female     Birth control/protection: Condom, None       Immunizations:   Immunization History   Administered Date(s) Administered    COVID-19 (PFIZER) Purple Cap Monovalent 01/15/2021, 02/05/2021    Flublok 18+yrs 10/04/2023    Fluzone (or Fluarix & Flulaval for VFC) >6mos 09/25/2015    Hepatitis B Adult/Adolescent IM 09/25/2015    Influenza, Unspecified 09/25/2015    MMR 09/25/2015    Tdap 09/25/2015    Varicella 09/25/2015        Medications:     Current Outpatient Medications:     aspirin (Aspirin Low Dose) 81 MG EC tablet, Take 1 tablet by mouth Daily., Disp: 90 tablet, Rfl: 3    atorvastatin (LIPITOR) 80 MG tablet, Take 1 tablet by mouth Daily., Disp: 90 tablet, Rfl: 1    carvedilol (COREG) 25 MG tablet, Take 2 tablets by mouth 2 (Two) Times a Day With Meals., Disp: 360 tablet, Rfl: 3    clopidogrel (PLAVIX) 75 MG tablet, Take 1 tablet by mouth Daily., Disp: 90 tablet, Rfl: 3    losartan-hydrochlorothiazide (Hyzaar) 100-25 MG per tablet, Take 1 tablet by mouth Daily., Disp: 90 tablet, Rfl: 3    nitroglycerin (NITROSTAT) 0.4 MG SL tablet, Place 1 tablet under the tongue As Needed. Take no more than 3 doses in 15 minutes. If pain persists call 911., Disp: 25 tablet, Rfl: 2    Semaglutide, 2 MG/DOSE, (Ozempic, 2 MG/DOSE,) 8 MG/3ML solution pen-injector, Inject 2 mg under the skin into the appropriate area as directed 1 (One) Time Per Week., Disp: 9 mL, Rfl: 3    vitamin D (ERGOCALCIFEROL) 1.25 MG (11256 UT) capsule capsule, Take 1 capsule by mouth 1 (One) Time Per Week., Disp: 12 capsule, Rfl: 0    famotidine (Pepcid) 20 MG tablet, Take 1 tablet by mouth 2 (Two) Times a Day., Disp: 60 tablet,  "Rfl: 2    Allergies:   Allergies   Allergen Reactions    Codeine Itching, Hives and Unknown (See Comments)    Ace Inhibitors Other (See Comments), Rash and Unknown (See Comments)     Coughing, seizures  Seizures  Coughing, seizures       Objective     Vital Signs  /60   Temp 97.8 °F (36.6 °C) (Temporal)   Ht 177.8 cm (70\")   Wt (!) 145 kg (320 lb 9.6 oz)   SpO2 96%   BMI 46.00 kg/m²   Estimated body mass index is 46 kg/m² as calculated from the following:    Height as of this encounter: 177.8 cm (70\").    Weight as of this encounter: 145 kg (320 lb 9.6 oz).    Class 3 Severe Obesity (BMI >=40). Obesity-related health conditions include the following: hypertension, coronary heart disease, and diabetes mellitus. Obesity is improving with treatment. BMI is is above average; BMI management plan is completed. We discussed low calorie, low carb based diet program, portion control, and increasing exercise.         Physical Exam  Constitutional:       Appearance: Normal appearance. He is obese.   Cardiovascular:      Rate and Rhythm: Normal rate and regular rhythm.      Pulses: Normal pulses.      Heart sounds: Normal heart sounds.   Pulmonary:      Effort: Pulmonary effort is normal.      Breath sounds: Normal breath sounds.   Neurological:      Mental Status: He is alert.          Result Review :                Results for orders placed or performed in visit on 01/10/25   POC Glycosylated Hemoglobin (Hb A1C)    Collection Time: 01/10/25  8:47 AM    Specimen: Blood   Result Value Ref Range    Hemoglobin A1C 5.4 4.5 - 5.7 %    Lot Number [id number]     Expiration Date 9/6/26          Assessment and Plan     1. Type 2 diabetes mellitus with hyperglycemia, unspecified whether long term insulin use   A1c is less than 6.  His diabetes is under control continue with Ozempic 2 mg q. weekly    Continue diet and lifestyle modifications as prescribed. Encouraged patient to maintain a diabetic diet, Increase lean protein " and vegetable intake. Avoid sugary drinks and processed carbs including crackers, cookies, cakes. Recommend at least 30 minutes of exercise daily, at least 5 days per week. Increase exercise gradually. Blood glucose goal <150 fasting, <180 2 hr postprandial. Diabetic complications discussed. Annual eye examinations at Ophthalmology discussed, dental hygiene discussed and foot care reviewed., home glucose monitoring emphasized, all medications, side effects and compliance discussed carefully and Hypoglycemia management and prevention reviewed. Reviewed ‘ABCs’ of diabetes manageme    - POC Glycosylated Hemoglobin (Hb A1C)  - MicroAlbumin, Urine, Random - Urine, Clean Catch; Future  - Semaglutide, 2 MG/DOSE, (Ozempic, 2 MG/DOSE,) 8 MG/3ML solution pen-injector; Inject 2 mg under the skin into the appropriate area as directed 1 (One) Time Per Week.  Dispense: 9 mL; Refill: 3    2. Screening for colon cancer    - Cologuard - Stool, Per Rectum; Future    3. Gastroesophageal reflux disease, unspecified whether esophagitis presen  Pepcid as needed    4. Coronary artery disease due to lipid rich plaque  Asymptomatic  Currently on ASA, plavix and statin therapy  He is on Coreg follows up with cardiology      5. Hypertension, unspecified type  Controlled  Continue with losartan hydrochlorothiazide    6. Class 3 severe obesity due to excess calories with serious comorbidity and body mass index (BMI) of 45.0 to 49.9 in adult  He has been losing weight  Current weight 320 pounds  Starting weight in August 1, 2024 history of 232  Discussed risk associated with obesity. he was given instructions on calorie counting as well as on decreasing carbohydrate intake. he was also encouraged to start exercise regimen.  Instructed patient to download fitness joycelyn on his smart phone to aid in calorie counting and exercise tracking.         Follow Up  Return in about 6 months (around 7/10/2025) for Recheck.    Aakash Onofre MD  [facility]  [address]  [facility]  [address]  [address] [address] [phone]

## 2025-02-21 DIAGNOSIS — R79.89 LOW TESTOSTERONE IN MALE: Primary | ICD-10-CM

## 2025-02-21 RX ORDER — TESTOSTERONE 20.25 MG/1.25G
GEL TOPICAL
Qty: 75 G | Refills: 0 | Status: SHIPPED | OUTPATIENT
Start: 2025-02-21

## 2025-03-06 ENCOUNTER — PRIOR AUTHORIZATION (OUTPATIENT)
Dept: INTERNAL MEDICINE | Facility: CLINIC | Age: 45
End: 2025-03-06
Payer: COMMERCIAL

## 2025-04-10 RX ORDER — ATORVASTATIN CALCIUM 80 MG/1
80 TABLET, FILM COATED ORAL DAILY
Qty: 90 TABLET | Refills: 1 | Status: SHIPPED | OUTPATIENT
Start: 2025-04-10

## 2025-04-11 ENCOUNTER — TELEPHONE (OUTPATIENT)
Dept: INTERNAL MEDICINE | Facility: CLINIC | Age: 45
End: 2025-04-11
Payer: COMMERCIAL

## 2025-04-11 NOTE — TELEPHONE ENCOUNTER
Caller: NATI    Relationship to patient:       Best call back number: [phone]    Provider: ANDREI    Medication PA needed: OZEMPIC    Reason for call/Prior Auth: PA WAS CLOSED BUT NATI AT [facility] STATES THAT IF WE DAVON THE ADDITIONAL INFO URGENT AND FAX IT BACK ASAP SHE CAN GET IT APPROVED      [phone] - FAX#   STATE ON FRONT OF FAX: ADDITIONAL INFO FOR ORDER ID # [id number]    SEND IN LAB A1C FROM 2 YEARS AGO WHEN HIS A1C WAS ABOVE 6.5

## 2025-04-15 NOTE — TELEPHONE ENCOUNTER
Caller: NATI    Relationship to patient: [facility].    Best call back number: [phone]     Patient is needing:  PA WAS CLOSED BUT NATI AT [facility] STATES THAT IF WE DAVON THE ADDITIONAL INFO URGENT AND FAX IT BACK ASAP SHE CAN GET IT APPROVED        [phone] - FAX#   STATE ON FRONT OF FAX: ADDITIONAL INFO FOR ORDER ID # [id number]     SEND IN LAB A1C FROM 2 YEARS AGO WHEN HIS A1C WAS ABOVE 6.5

## 2025-04-17 NOTE — TELEPHONE ENCOUNTER
Amanda from [facility] called stating to get Ozempic approved she needs a paper faxed over if the patients A1C is 6.5 or greater to [phone] and put Order# [id number] on it.    Amanda's number is [phone]

## 2025-04-24 DIAGNOSIS — R79.89 LOW TESTOSTERONE IN MALE: ICD-10-CM

## 2025-04-25 ENCOUNTER — TELEPHONE (OUTPATIENT)
Dept: SLEEP MEDICINE | Age: 45
End: 2025-04-25
Payer: COMMERCIAL

## 2025-04-25 RX ORDER — TESTOSTERONE 20.25 MG/1.25G
GEL TOPICAL EVERY MORNING
Qty: 75 G | Refills: 0 | Status: SHIPPED | OUTPATIENT
Start: 2025-04-25

## 2025-04-25 NOTE — TELEPHONE ENCOUNTER
I spoke with patient to inquire about his JOHN and records. He stated he believes it was eventually [facility]. I called them, and they are sending over the sleep study and a DL.

## 2025-04-28 ENCOUNTER — PATIENT ROUNDING (BHMG ONLY) (OUTPATIENT)
Dept: SLEEP MEDICINE | Age: 45
End: 2025-04-28
Payer: COMMERCIAL

## 2025-04-28 ENCOUNTER — OFFICE VISIT (OUTPATIENT)
Dept: SLEEP MEDICINE | Age: 45
End: 2025-04-28
Payer: COMMERCIAL

## 2025-04-28 VITALS
SYSTOLIC BLOOD PRESSURE: 130 MMHG | BODY MASS INDEX: 45.1 KG/M2 | TEMPERATURE: 97.3 F | DIASTOLIC BLOOD PRESSURE: 80 MMHG | OXYGEN SATURATION: 94 % | HEIGHT: 70 IN | WEIGHT: 315 LBS | HEART RATE: 76 BPM

## 2025-04-28 DIAGNOSIS — E66.01 MORBID OBESITY WITH BODY MASS INDEX (BMI) OF 40.0 OR HIGHER: ICD-10-CM

## 2025-04-28 DIAGNOSIS — G47.33 OSA ON CPAP: Primary | ICD-10-CM

## 2025-04-28 PROBLEM — F32.9 REACTIVE DEPRESSION: Status: ACTIVE | Noted: 2022-06-17

## 2025-04-28 NOTE — PROGRESS NOTES
April 28, 2025    Hello, may I speak with Jean Glynn Jr.?    My name is Angeles      I am  with [facility]  [address]  [facility] [address] [phone].    Before we get started may I verify your date of birth? 1980    I am calling to officially welcome you to our practice and ask about your recent visit. Is this a good time to talk? yes    Tell me about your visit with us. What things went well?  Very informative also Dr Veliz is a great Physician       We're always looking for ways to make our patients' experiences even better. Do you have recommendations on ways we may improve?  no    Overall were you satisfied with your first visit to our practice? yes       I appreciate you taking the time to speak with me today. Is there anything else I can do for you? no      Thank you, and have a great day.

## 2025-04-28 NOTE — PROGRESS NOTES
Jean Glynn Jr. is a 44 y.o. male.   Chief Complaint   Patient presents with    Sleep Apnea    Witnessed Apnea    Snoring    Heartburn       HPI     44 y.o. male seen in consultation at the request of No ref. provider found for evaluation of the above.     He was originally diagnosed with obstructive sleep apnea at  in 2017 I believe.  He describes undergoing an HSAT at that time.  A year later in October 2018 he underwent a PSG with titration at .  I do have the records of this study.  This revealed an AHI of 30.5.  Optimal CPAP pressure appeared to be 13 cm H2O at the time.    He has been followed at  since then.  His device is quite old and he is in need of a new CPAP machine.  He wanted to follow-up with [facility] as opposed to  for ongoing treatment as he works here.    His current CPAP is set at 17 cm H2O.  Average usage is 5 hours and 37 minutes.  AHI is 2.5 and leak averages 18 minutes per night.    He has had problems with mask leak.  He attributes this to his beard.  He uses an under the nose type fullface mask.  He has tried a nasal mask with poor tolerance.    [place] Scale is: 4/24    The patient's relevant past medical, surgical, family, and social history reviewed and updated in Epic as appropriate.    Current medications are:   Current Outpatient Medications:     aspirin (Aspirin Low Dose) 81 MG EC tablet, Take 1 tablet by mouth Daily., Disp: 90 tablet, Rfl: 3    atorvastatin (LIPITOR) 80 MG tablet, Take 1 tablet by mouth Daily., Disp: 90 tablet, Rfl: 1    carvedilol (COREG) 25 MG tablet, Take 2 tablets by mouth 2 (Two) Times a Day With Meals., Disp: 360 tablet, Rfl: 3    clopidogrel (PLAVIX) 75 MG tablet, Take 1 tablet by mouth Daily., Disp: 90 tablet, Rfl: 3    famotidine (Pepcid) 20 MG tablet, Take 1 tablet by mouth 2 (Two) Times a Day., Disp: 60 tablet, Rfl: 2    losartan-hydrochlorothiazide (Hyzaar) 100-25 MG per tablet, Take 1 tablet by mouth Daily., Disp: 90 tablet, Rfl: 3     "Semaglutide, 2 MG/DOSE, (Ozempic, 2 MG/DOSE,) 8 MG/3ML solution pen-injector, Inject 2 mg under the skin into the appropriate area as directed 1 (One) Time Per Week., Disp: 9 mL, Rfl: 3    Testosterone 1.62 % gel, Apply 2 pumps topically to the appropriate area as directed Every Morning., Disp: 75 g, Rfl: 0    vitamin D (ERGOCALCIFEROL) 1.25 MG (97455 UT) capsule capsule, Take 1 capsule by mouth 1 (One) Time Per Week., Disp: 12 capsule, Rfl: 0    nitroglycerin (NITROSTAT) 0.4 MG SL tablet, Place 1 tablet under the tongue As Needed. Take no more than 3 doses in 15 minutes. If pain persists call 911. (Patient not taking: Reported on 4/28/2025), Disp: 25 tablet, Rfl: 2.    Review of Systems    Review of Systems  ROS documented in patient questionnaire ×14 systems.  Otherwise negative except as noted in HPI.    Physical Exam    Blood pressure 130/80, pulse 76, temperature 97.3 °F (36.3 °C), temperature source Temporal, height 177.8 cm (70\"), weight (!) 147 kg (325 lb), SpO2 94%. Body mass index is 46.63 kg/m².    Physical Exam  Vitals and nursing note reviewed.   Constitutional:       Appearance: Normal appearance. He is well-developed.   HENT:      Head: Normocephalic and atraumatic.      Nose: Nose normal.      Mouth/Throat:      Mouth: Mucous membranes are moist.      Pharynx: Oropharynx is clear. No oropharyngeal exudate.      Comments: Class IV airway  Eyes:      General: No scleral icterus.     Conjunctiva/sclera: Conjunctivae normal.   Neck:      Thyroid: No thyromegaly.      Trachea: No tracheal deviation.   Cardiovascular:      Rate and Rhythm: Normal rate and regular rhythm.      Heart sounds: No murmur heard.     No friction rub. No gallop.   Pulmonary:      Effort: Pulmonary effort is normal. No respiratory distress.      Breath sounds: No wheezing or rales.   Musculoskeletal:         General: No deformity. Normal range of motion.   Skin:     General: Skin is warm and dry.      Findings: No rash. "   Neurological:      Mental Status: He is alert and oriented to person, place, and time.   Psychiatric:         Behavior: Behavior normal.         Thought Content: Thought content normal.         DATA:    Reviewed PSG dated 10/2/2018 from  as described above    Reviewed current download from his CPAP device as described above.    ASSESSMENT:    Problem List Items Addressed This Visit          Pulmonary Problems    JOHN on CPAP - Primary    Relevant Orders    PAP Therapy       Other    Morbid obesity with body mass index (BMI) of 40.0 or higher       44-year-old male with severe obstructive sleep apnea.  He is on CPAP at 17 cm H2O using an under the nose type fullface mask.  He would like to establish care in our sleep facility after being primarily cared for at  since his diagnosis.    He needs a new device as his device is well over 5 years old.  He also notes some intermittent mask leak.  He has lost some weight since his original diagnosis and also had a tonsillectomy.  I wonder if he would benefit from a lower CPAP pressure.  I think trying him on auto CPAP would be appropriate at this point.    PLAN:    I will reorder his CPAP with a new device with settings of APAP 14-20 cm H2O  He can continue to use an under the nose fullface mask  We discussed the benefits of ongoing weight loss and he is on a GLP-1 drug.  Follow-up 30-90 days after initiating his new therapy for assessment of clinical response and download    I have reviewed the results of my evaluation and impression and discussed my recommendations in detail with the patient.    Signed by  Yonatan Veliz MD    April 28, 2025      CC: Aakash Onofre MD          No ref. provider found

## 2025-05-01 ENCOUNTER — OFFICE VISIT (OUTPATIENT)
Dept: CARDIOLOGY | Facility: CLINIC | Age: 45
End: 2025-05-01
Payer: COMMERCIAL

## 2025-05-01 VITALS
BODY MASS INDEX: 44.1 KG/M2 | WEIGHT: 315 LBS | OXYGEN SATURATION: 94 % | SYSTOLIC BLOOD PRESSURE: 128 MMHG | HEART RATE: 103 BPM | DIASTOLIC BLOOD PRESSURE: 86 MMHG | HEIGHT: 71 IN

## 2025-05-01 DIAGNOSIS — E66.01 MORBID OBESITY WITH BODY MASS INDEX (BMI) OF 40.0 OR HIGHER: ICD-10-CM

## 2025-05-01 DIAGNOSIS — I10 PRIMARY HYPERTENSION: ICD-10-CM

## 2025-05-01 DIAGNOSIS — E78.2 MIXED HYPERLIPIDEMIA: ICD-10-CM

## 2025-05-01 DIAGNOSIS — I25.118 CORONARY ARTERY DISEASE OF NATIVE ARTERY OF NATIVE HEART WITH STABLE ANGINA PECTORIS: Primary | ICD-10-CM

## 2025-05-01 NOTE — PROGRESS NOTES
OFFICE VISIT  NOTE  [facility] CARDIOLOGY      Name: Jean Glynn Jr.    Date: 2025  MRN:  [id number]  :  1980      REFERRING/PRIMARY PROVIDER:  Aakash Onofre MD     Chief Complaint   Patient presents with    Coronary Artery Disease     HPI: Jean Glynn Jr. is a 44 y.o. male who presents today for CAD.  Anterior STEMI at age 35 (8/15/2016, PCI of the LAD and circumflex, 2018 PCI to proximal RCA (4.0 JE postdilated with 4.5 NC) at .  History of hypertension, hyperlipidemia, diabetes mellitus, and obesity with a BMI 45.  Working on weight loss with diet and exercise, as well as Ozempic. Has slowly lost weight over the years. Getting back into the gym now, going 2-3 days per week, doing weight lifting, walks 7500-10K steps at work. Denies any angina or unusual VEGAS, no LE edema. He has random precordial pain that is brief and nonexertional.     ROS:Pertinent positives as listed in the HPI.  All other systems reviewed and negative.    Past Medical History:   Diagnosis Date    Allergic     Coronary artery disease     Depression     HL (hearing loss)     Hyperlipidemia     Hypertension     Myocardial infarction     Obesity     Sleep apnea        Past Surgical History:   Procedure Laterality Date    ADENOIDECTOMY      TONSILLECTOMY         Social History     Socioeconomic History    Marital status: Single   Tobacco Use    Smoking status: Former     Current packs/day: 0.00     Average packs/day: 2.0 packs/day for 10.0 years (20.0 ttl pk-yrs)     Types: Cigarettes     Start date: 1991     Quit date: 2000     Years since quittin.3     Passive exposure: Past    Smokeless tobacco: Never   Vaping Use    Vaping status: Every Day    Start date: 2000    Substances: Nicotine    Devices: Pre-filled or refillable cartridge   Substance and Sexual Activity    Alcohol use: Yes     Alcohol/week: 2.0 standard drinks of alcohol     Types: 2 Shots of liquor per week     Comment:  "occ    Drug use: Never    Sexual activity: Yes     Partners: Female     Birth control/protection: Condom, None       Family History   Problem Relation Age of Onset    No Known Problems Mother     No Known Problems Father     No Known Problems Sister     No Known Problems Brother     Diabetes Paternal Uncle     Cancer Paternal Grandmother         Allergies   Allergen Reactions    Codeine Itching, Hives and Unknown (See Comments)    Ace Inhibitors Other (See Comments), Rash and Unknown (See Comments)     Coughing, seizures  Seizures  Coughing, seizures       Current Outpatient Medications   Medication Instructions    Aspirin Low Dose 81 mg, Oral, Daily    atorvastatin (LIPITOR) 80 mg, Oral, Daily    carvedilol (COREG) 50 mg, Oral, 2 Times Daily With Meals    clopidogrel (PLAVIX) 75 mg, Oral, Daily    losartan-hydrochlorothiazide (Hyzaar) 100-25 MG per tablet 1 tablet, Oral, Daily    nitroglycerin (NITROSTAT) 0.4 MG SL tablet Place 1 tablet under the tongue As Needed. Take no more than 3 doses in 15 minutes. If pain persists call 911.    Ozempic (2 MG/DOSE) 2 mg, Subcutaneous, Weekly    Testosterone 1.62 % gel Apply 2 pumps topically to the appropriate area as directed Every Morning.       Vitals:    05/01/25 0921   BP: 128/86   BP Location: Left arm   Patient Position: Sitting   Cuff Size: Adult   Pulse: 103   SpO2: 94%   Weight: (!) 149 kg (328 lb)   Height: 180.3 cm (71\")     Body mass index is 45.75 kg/m².    PHYSICAL EXAM:    General Appearance:   well developed  Obese   Neck:  thyroid not enlarged  supple  Respiratory:  no respiratory distress  normal breath sounds  no rales  Cardiovascular:  no jugular venous distention  regular rhythm  apical impulse normal  S1 normal, S2 normal  no S3, no S4   no murmur  no rub, no thrill  lower extremity edema: none    Skin:   warm, dry    RESULTS:   Procedures    Labs:  Lab Results   Component Value Date    CHOL 154 10/18/2024    TRIG 72 10/18/2024    HDL 37 (L) 10/18/2024 " "    (H) 10/18/2024    AST 22 01/13/2023    ALT 39 01/13/2023     Lab Results   Component Value Date    HGBA1C 5.4 01/10/2025     Creatinine   Date Value Ref Range Status   10/18/2024 1.09 0.76 - 1.27 mg/dL Final   07/12/2024 0.95 0.76 - 1.27 mg/dL Final   01/13/2023 0.94 0.76 - 1.27 mg/dL Final     No results found for: \"EGFRIFNONA\"      ASSESSMENT:  Problem List Items Addressed This Visit          Cardiac and Vasculature    Coronary artery disease of native artery of native heart with stable angina pectoris - Primary    Primary hypertension    Mixed hyperlipidemia       Endocrine and Metabolic    Morbid obesity with body mass index (BMI) of 40.0 or higher       PLAN:    1.  CAD:  PCI of the LAD and circumflex 2016, PCI of the RCA 2018  Continue aspirin and statin indefinitely  Continue exercise, weight loss and diabetes management     Discussed no need for ongoing clopidogrel but at this time he would like to continue it     2.  Diabetes mellitus type 2:  Consider SGLT2 inhibitor  Continue GLP-1 agonist  Recent A1C at goal      3.  Obesity, BMI 45  Discussed importance of weight loss interventions including GLP-1 agonist, exercise, and decrease caloric intake  He is actively trying to lose weight      4.  Hyperlipidemia:  Goal LDL less than 55 given extensive premature CAD  Continue max dose atorvastatin, if not at goal in 3-6 months would add PCSK9 inhibitor. Discussed dietary modifications and repeat lipids with PCP in July. If not at target, will add PCSK-9 inhibitor      5.  Hypertension:  Goal blood pressure less than 130/80  BP at target, continue current regimen   Low-sodium diet recommended          Advance Care Planning   ACP discussion was held with the patient during this visit. Patient has an advance directive (not in EMR), copy requested.          Follow-up   Return in about 9 months (around 2/1/2026) for with RDS.      Electronically signed by Opal Espinal PA-C, 05/01/25, 10:22 AM " EDT.

## 2025-06-06 DIAGNOSIS — R79.89 LOW TESTOSTERONE IN MALE: ICD-10-CM

## 2025-06-09 RX ORDER — TESTOSTERONE 20.25 MG/1.25G
GEL TOPICAL EVERY MORNING
Qty: 75 G | Refills: 0 | Status: SHIPPED | OUTPATIENT
Start: 2025-06-09

## 2025-07-10 ENCOUNTER — LAB (OUTPATIENT)
Dept: INTERNAL MEDICINE | Facility: CLINIC | Age: 45
End: 2025-07-10
Payer: COMMERCIAL

## 2025-07-10 ENCOUNTER — OFFICE VISIT (OUTPATIENT)
Dept: INTERNAL MEDICINE | Facility: CLINIC | Age: 45
End: 2025-07-10
Payer: COMMERCIAL

## 2025-07-10 VITALS
BODY MASS INDEX: 44.1 KG/M2 | OXYGEN SATURATION: 96 % | HEIGHT: 71 IN | HEART RATE: 90 BPM | SYSTOLIC BLOOD PRESSURE: 138 MMHG | WEIGHT: 315 LBS | TEMPERATURE: 96.8 F | DIASTOLIC BLOOD PRESSURE: 88 MMHG

## 2025-07-10 DIAGNOSIS — I25.10 CORONARY ARTERY DISEASE DUE TO LIPID RICH PLAQUE: ICD-10-CM

## 2025-07-10 DIAGNOSIS — E11.65 TYPE 2 DIABETES MELLITUS WITH HYPERGLYCEMIA, UNSPECIFIED WHETHER LONG TERM INSULIN USE: Primary | ICD-10-CM

## 2025-07-10 DIAGNOSIS — E66.813 CLASS 3 SEVERE OBESITY DUE TO EXCESS CALORIES WITH SERIOUS COMORBIDITY AND BODY MASS INDEX (BMI) OF 45.0 TO 49.9 IN ADULT: ICD-10-CM

## 2025-07-10 DIAGNOSIS — R79.89 LOW TESTOSTERONE IN MALE: ICD-10-CM

## 2025-07-10 DIAGNOSIS — E78.2 MIXED HYPERLIPIDEMIA: ICD-10-CM

## 2025-07-10 DIAGNOSIS — I25.83 CORONARY ARTERY DISEASE DUE TO LIPID RICH PLAQUE: ICD-10-CM

## 2025-07-10 DIAGNOSIS — E11.65 TYPE 2 DIABETES MELLITUS WITH HYPERGLYCEMIA, UNSPECIFIED WHETHER LONG TERM INSULIN USE: ICD-10-CM

## 2025-07-10 DIAGNOSIS — I10 HYPERTENSION, UNSPECIFIED TYPE: ICD-10-CM

## 2025-07-10 LAB
ALBUMIN SERPL-MCNC: 4.1 G/DL (ref 3.5–5.2)
ALBUMIN UR-MCNC: <1.2 MG/DL
ALBUMIN/GLOB SERPL: 1.4 G/DL
ALP SERPL-CCNC: 54 U/L (ref 39–117)
ALT SERPL W P-5'-P-CCNC: 27 U/L (ref 1–41)
ANION GAP SERPL CALCULATED.3IONS-SCNC: 11.3 MMOL/L (ref 5–15)
AST SERPL-CCNC: 23 U/L (ref 1–40)
BASOPHILS # BLD AUTO: 0.02 10*3/MM3 (ref 0–0.2)
BASOPHILS NFR BLD AUTO: 0.3 % (ref 0–1.5)
BILIRUB SERPL-MCNC: 0.6 MG/DL (ref 0–1.2)
BUN SERPL-MCNC: 10 MG/DL (ref 6–20)
BUN/CREAT SERPL: 10.4 (ref 7–25)
CALCIUM SPEC-SCNC: 9.4 MG/DL (ref 8.6–10.5)
CHLORIDE SERPL-SCNC: 105 MMOL/L (ref 98–107)
CHOLEST SERPL-MCNC: 165 MG/DL (ref 0–200)
CO2 SERPL-SCNC: 24.7 MMOL/L (ref 22–29)
CREAT SERPL-MCNC: 0.96 MG/DL (ref 0.76–1.27)
DEPRECATED RDW RBC AUTO: 41.2 FL (ref 37–54)
EGFRCR SERPLBLD CKD-EPI 2021: 99.3 ML/MIN/1.73
EOSINOPHIL # BLD AUTO: 0.2 10*3/MM3 (ref 0–0.4)
EOSINOPHIL NFR BLD AUTO: 3.2 % (ref 0.3–6.2)
ERYTHROCYTE [DISTWIDTH] IN BLOOD BY AUTOMATED COUNT: 12.7 % (ref 12.3–15.4)
GLOBULIN UR ELPH-MCNC: 3 GM/DL
GLUCOSE SERPL-MCNC: 112 MG/DL (ref 65–99)
HBA1C MFR BLD: 5.9 % (ref 4.8–5.6)
HCT VFR BLD AUTO: 45.4 % (ref 37.5–51)
HDLC SERPL-MCNC: 43 MG/DL (ref 40–60)
HGB BLD-MCNC: 15.8 G/DL (ref 13–17.7)
IMM GRANULOCYTES # BLD AUTO: 0.04 10*3/MM3 (ref 0–0.05)
IMM GRANULOCYTES NFR BLD AUTO: 0.6 % (ref 0–0.5)
LDLC SERPL CALC-MCNC: 109 MG/DL (ref 0–100)
LDLC/HDLC SERPL: 2.52 {RATIO}
LYMPHOCYTES # BLD AUTO: 1.32 10*3/MM3 (ref 0.7–3.1)
LYMPHOCYTES NFR BLD AUTO: 21 % (ref 19.6–45.3)
MCH RBC QN AUTO: 31.5 PG (ref 26.6–33)
MCHC RBC AUTO-ENTMCNC: 34.8 G/DL (ref 31.5–35.7)
MCV RBC AUTO: 90.4 FL (ref 79–97)
MONOCYTES # BLD AUTO: 0.48 10*3/MM3 (ref 0.1–0.9)
MONOCYTES NFR BLD AUTO: 7.6 % (ref 5–12)
NEUTROPHILS NFR BLD AUTO: 4.24 10*3/MM3 (ref 1.7–7)
NEUTROPHILS NFR BLD AUTO: 67.3 % (ref 42.7–76)
NRBC BLD AUTO-RTO: 0 /100 WBC (ref 0–0.2)
PLATELET # BLD AUTO: 251 10*3/MM3 (ref 140–450)
PMV BLD AUTO: 10.9 FL (ref 6–12)
POTASSIUM SERPL-SCNC: 4 MMOL/L (ref 3.5–5.2)
PROT SERPL-MCNC: 7.1 G/DL (ref 6–8.5)
RBC # BLD AUTO: 5.02 10*6/MM3 (ref 4.14–5.8)
SODIUM SERPL-SCNC: 141 MMOL/L (ref 136–145)
TESTOST SERPL-MCNC: 496 NG/DL (ref 249–836)
TRIGL SERPL-MCNC: 69 MG/DL (ref 0–150)
VLDLC SERPL-MCNC: 13 MG/DL (ref 5–40)
WBC NRBC COR # BLD AUTO: 6.3 10*3/MM3 (ref 3.4–10.8)

## 2025-07-10 PROCEDURE — 84403 ASSAY OF TOTAL TESTOSTERONE: CPT | Performed by: STUDENT IN AN ORGANIZED HEALTH CARE EDUCATION/TRAINING PROGRAM

## 2025-07-10 PROCEDURE — 83036 HEMOGLOBIN GLYCOSYLATED A1C: CPT | Performed by: STUDENT IN AN ORGANIZED HEALTH CARE EDUCATION/TRAINING PROGRAM

## 2025-07-10 PROCEDURE — 36415 COLL VENOUS BLD VENIPUNCTURE: CPT | Performed by: STUDENT IN AN ORGANIZED HEALTH CARE EDUCATION/TRAINING PROGRAM

## 2025-07-10 PROCEDURE — 80061 LIPID PANEL: CPT | Performed by: STUDENT IN AN ORGANIZED HEALTH CARE EDUCATION/TRAINING PROGRAM

## 2025-07-10 PROCEDURE — 85025 COMPLETE CBC W/AUTO DIFF WBC: CPT | Performed by: STUDENT IN AN ORGANIZED HEALTH CARE EDUCATION/TRAINING PROGRAM

## 2025-07-10 PROCEDURE — 80053 COMPREHEN METABOLIC PANEL: CPT | Performed by: STUDENT IN AN ORGANIZED HEALTH CARE EDUCATION/TRAINING PROGRAM

## 2025-07-10 PROCEDURE — 82043 UR ALBUMIN QUANTITATIVE: CPT | Performed by: STUDENT IN AN ORGANIZED HEALTH CARE EDUCATION/TRAINING PROGRAM

## 2025-07-10 RX ORDER — TESTOSTERONE CYPIONATE 200 MG/ML
200 INJECTION, SOLUTION INTRAMUSCULAR
Qty: 10 ML | Refills: 0 | Status: SHIPPED | OUTPATIENT
Start: 2025-07-10

## 2025-07-10 RX ORDER — TESTOSTERONE 20.25 MG/1.25G
GEL TOPICAL EVERY MORNING
Qty: 75 G | Refills: 0 | Status: CANCELLED | OUTPATIENT
Start: 2025-07-10

## 2025-07-10 RX ORDER — SYRINGE W-NEEDLE,DISPOSAB,3 ML 25GX5/8"
1 SYRINGE, EMPTY DISPOSABLE MISCELLANEOUS
Qty: 100 EACH | Refills: 2 | Status: SHIPPED | OUTPATIENT
Start: 2025-07-10

## 2025-07-10 RX ORDER — NITROGLYCERIN 0.4 MG/1
0.4 TABLET SUBLINGUAL AS NEEDED
Qty: 25 TABLET | Refills: 2 | Status: SHIPPED | OUTPATIENT
Start: 2025-07-10

## 2025-07-10 NOTE — PROGRESS NOTES
Office Note     Name: Jean Glynn    : 1980     MRN: [id number]     Chief Complaint  Obesity (F/u)    Subjective     History of Present Illness:  Jean Glynn is a 44 y.o. male who presents today for     Follow up Type II DM  Currently on Ozempic 2mg Qweekly  Last A1c 5.4    Coronary artery Disesae:   PCI of the LAD and circumflex , PCI of the RCA    He is on ASA and statin therapy for dyslipidemia    Hypertensin: BP has been at goal, he been tolerating his current medications    He has been on testosterone gel for TRT, he would like to switch to IM injections, has not noticed a noticeable effect  Denies hx of blood clots,     Review of Systems:   Review of Systems   All other systems reviewed and are negative.      Past Medical History:   Past Medical History:   Diagnosis Date    Allergic     Coronary artery disease     Depression     HL (hearing loss)     Hyperlipidemia     Hypertension     Myocardial infarction     Obesity     Sleep apnea        Past Surgical History:   Past Surgical History:   Procedure Laterality Date    ADENOIDECTOMY      TONSILLECTOMY         Family History:   Family History   Problem Relation Age of Onset    No Known Problems Mother     No Known Problems Father     No Known Problems Sister     No Known Problems Brother     Diabetes Paternal Uncle     Cancer Paternal Grandmother        Social History:   Social History     Socioeconomic History    Marital status: Single   Tobacco Use    Smoking status: Former     Current packs/day: 0.00     Average packs/day: 2.0 packs/day for 10.0 years (20.0 ttl pk-yrs)     Types: Cigarettes     Start date: 1991     Quit date:      Years since quittin.5     Passive exposure: Past    Smokeless tobacco: Never   Vaping Use    Vaping status: Every Day    Start date: 2000    Substances: Nicotine    Devices: Pre-filled or refillable cartridge   Substance and Sexual Activity    Alcohol use: Yes     Alcohol/week: 2.0 standard  "drinks of alcohol     Types: 2 Shots of liquor per week     Comment: occ    Drug use: Never    Sexual activity: Yes     Partners: Female     Birth control/protection: Condom, None       Immunizations:   Immunization History   Administered Date(s) Administered    COVID-19 (PFIZER) Purple Cap Monovalent 01/15/2021, 02/05/2021    Flublok 18+yrs 10/04/2023    Fluzone  >6mos 10/03/2024    Fluzone (or Fluarix & Flulaval for VFC) >6mos 09/25/2015    Hepatitis B Adult/Adolescent IM 09/25/2015    Influenza, Unspecified 09/25/2015    MMR 09/25/2015    Tdap 09/25/2015    Varicella 09/25/2015        Medications:     Current Outpatient Medications:     aspirin (Aspirin Low Dose) 81 MG EC tablet, Take 1 tablet by mouth Daily., Disp: 90 tablet, Rfl: 3    atorvastatin (LIPITOR) 80 MG tablet, Take 1 tablet by mouth Daily., Disp: 90 tablet, Rfl: 1    carvedilol (COREG) 25 MG tablet, Take 2 tablets by mouth 2 (Two) Times a Day With Meals., Disp: 360 tablet, Rfl: 3    clopidogrel (PLAVIX) 75 MG tablet, Take 1 tablet by mouth Daily., Disp: 90 tablet, Rfl: 3    losartan-hydrochlorothiazide (Hyzaar) 100-25 MG per tablet, Take 1 tablet by mouth Daily., Disp: 90 tablet, Rfl: 3    nitroglycerin (NITROSTAT) 0.4 MG SL tablet, Place 1 tablet under the tongue As Needed. Take no more than 3 doses in 15 minutes. If pain persists call 911., Disp: 25 tablet, Rfl: 2    Semaglutide, 2 MG/DOSE, (Ozempic, 2 MG/DOSE,) 8 MG/3ML solution pen-injector, Inject 2 mg under the skin into the appropriate area as directed 1 (One) Time Per Week., Disp: 9 mL, Rfl: 3    Testosterone 1.62 % gel, Apply 2 pumps topically to the appropriate area as directed Every Morning., Disp: 75 g, Rfl: 0    Needle, Disp, 22G X 1-1/2\" misc, Use as directed with Testosterone., Disp: 100 each, Rfl: 1    Syringe 22G X 1\" 3 ML misc, Inject 1 Application into the appropriate muscle as directed by prescriber Every 14 (Fourteen) Days., Disp: 100 each, Rfl: 2    Testosterone Cypionate " "(DEPOTESTOTERONE CYPIONATE) 200 MG/ML injection, Inject 1 mL into the appropriate muscle as directed by prescriber Every 14 (Fourteen) Days., Disp: 10 mL, Rfl: 0    Tirzepatide-Weight Management (ZEPBOUND) 5 MG/0.5ML solution auto-injector, Inject 0.5 mL under the skin into the appropriate area as directed 1 (One) Time Per Week., Disp: 2 mL, Rfl: 0    Allergies:   Allergies   Allergen Reactions    Codeine Itching, Hives and Unknown (See Comments)    Ace Inhibitors Other (See Comments), Rash and Unknown (See Comments)     Coughing, seizures  Seizures  Coughing, seizures       Objective     Vital Signs  /88   Pulse 90   Temp 96.8 °F (36 °C) (Temporal)   Ht 180.3 cm (70.98\")   Wt (!) 151 kg (332 lb 9.6 oz)   SpO2 96%   BMI 46.41 kg/m²   Estimated body mass index is 46.41 kg/m² as calculated from the following:    Height as of this encounter: 180.3 cm (70.98\").    Weight as of this encounter: 151 kg (332 lb 9.6 oz).            Physical Exam  Constitutional:       Appearance: Normal appearance. He is obese.   Cardiovascular:      Rate and Rhythm: Normal rate and regular rhythm.      Pulses: Normal pulses.      Heart sounds: Normal heart sounds.   Pulmonary:      Effort: Pulmonary effort is normal.      Breath sounds: Normal breath sounds.   Neurological:      Mental Status: He is alert.          Result Review :     Common labs          10/18/2024    10:41 10/18/2024    11:03 1/10/2025    08:47 1/10/2025    10:39   Common Labs   Glucose  98      BUN  13      Creatinine  1.09      Sodium  137      Potassium  4.4      Chloride  100      Calcium  10.1      WBC  6.55      Hemoglobin  14.7      Hematocrit  43.6      Platelets  277      Total Cholesterol  154      Triglycerides  72      HDL Cholesterol  37      LDL Cholesterol   103      Hemoglobin A1C 5.8   5.4     Microalbumin, Urine    <1.2                   Assessment and Plan     1. Type 2 diabetes mellitus with hyperglycemia, unspecified whether long term " insulin use  Has been controlled  Recheck A1c today  Switch from ozempic to Mounjaro   - CBC Auto Differential; Future  - Comprehensive Metabolic Panel; Future  - Lipid Panel; Future  - Hemoglobin A1c; Future  - MicroAlbumin, Urine, Random - Urine, Clean Catch; Future  - Ambulatory Referral for Diabetic Eye Exam-Ophthalmology    2. Low testosterone in male  Stop Testosterone gel  Discussed TRT with patient, reviewed risk vs benefits, patient would like to proceed  Will recheck in 3 months  - Testosterone Cypionate (DEPOTESTOTERONE CYPIONATE) 200 MG/ML injection; Inject 1 mL into the appropriate muscle as directed by prescriber Every 14 (Fourteen) Days.  Dispense: 10 mL; Refill: 0  - Testosterone; Future    3. Coronary artery disease due to lipid rich plaque  Continue with ASA and statin therapy    4. Hypertension, unspecified type  At goal therapy  Continue with BP medications    5. Mixed hyperlipidemia  Continue with statin therapy    6. Class 3 severe obesity due to excess calories with serious comorbidity and body mass index (BMI) of 45.0 to 49.9 in adult  Discussed risk associated with obesity. he was given instructions on calorie counting as well as on decreasing carbohydrate intake. he was also encouraged to start exercise regimen.  Instructed patient to download fitness joycelyn on his smart phone to aid in calorie counting and exercise tracking.     I spent 40 minutes caring for Jean on this date of service. This time includes time spent by me in the following activities:preparing for the visit, reviewing tests, obtaining and/or reviewing a separately obtained history, performing a medically appropriate examination and/or evaluation , counseling and educating the patient/family/caregiver, ordering medications, tests, or procedures, referring and communicating with other health care professionals , documenting information in the medical record, and independently interpreting results and communicating that  information with the patient/family/caregiver      Follow Up  No follow-ups on file.    MD ZACH CanasE [address]  [facility] PRIMARY CARE  [address]  [address] [address] [phone]

## 2025-07-15 ENCOUNTER — TELEPHONE (OUTPATIENT)
Dept: INTERNAL MEDICINE | Facility: CLINIC | Age: 45
End: 2025-07-15

## 2025-07-16 ENCOUNTER — PRIOR AUTHORIZATION (OUTPATIENT)
Dept: INTERNAL MEDICINE | Facility: CLINIC | Age: 45
End: 2025-07-16
Payer: COMMERCIAL

## 2025-07-21 ENCOUNTER — PRIOR AUTHORIZATION (OUTPATIENT)
Dept: INTERNAL MEDICINE | Facility: CLINIC | Age: 45
End: 2025-07-21
Payer: COMMERCIAL

## 2025-07-23 DIAGNOSIS — I25.10 CORONARY ARTERY DISEASE DUE TO LIPID RICH PLAQUE: ICD-10-CM

## 2025-07-23 DIAGNOSIS — I25.83 CORONARY ARTERY DISEASE DUE TO LIPID RICH PLAQUE: ICD-10-CM

## 2025-07-23 RX ORDER — LOSARTAN POTASSIUM AND HYDROCHLOROTHIAZIDE 25; 100 MG/1; MG/1
1 TABLET ORAL DAILY
Qty: 90 TABLET | Refills: 3 | Status: SHIPPED | OUTPATIENT
Start: 2025-07-23

## 2025-07-24 RX ORDER — CARVEDILOL 25 MG/1
50 TABLET ORAL 2 TIMES DAILY WITH MEALS
Qty: 360 TABLET | Refills: 3 | Status: SHIPPED | OUTPATIENT
Start: 2025-07-24

## 2025-07-24 NOTE — TELEPHONE ENCOUNTER
Rx Refill Note  Requested Prescriptions     Pending Prescriptions Disp Refills    carvedilol (COREG) 25 MG tablet 360 tablet 3     Sig: Take 2 tablets by mouth 2 (Two) Times a Day With Meals.      Last office visit with prescribing clinician: 7/10/2025   Last telemedicine visit with prescribing clinician: Visit date not found   Next office visit with prescribing clinician: 10/10/2025     Melina Bryson MA  07/24/25, 11:49 EDT

## 2025-07-24 NOTE — PROGRESS NOTES
Sleep Clinic Follow Up Note    Chief Complaint  Sleep Apnea    Subjective     History of Present Illness (from previous encounter on 4/28/2025 with Dr. Veliz):  He was originally diagnosed with obstructive sleep apnea at  in 2017 I believe.  He describes undergoing an HSAT at that time.  A year later in October 2018 he underwent a PSG with titration at .  I do have the records of this study.  This revealed an AHI of 30.5.  Optimal CPAP pressure appeared to be 13 cm H2O at the time.     He has been followed at  since then.  His device is quite old and he is in need of a new CPAP machine.  He wanted to follow-up with [facility] as opposed to  for ongoing treatment as he works here.     His current CPAP is set at 17 cm H2O.  Average usage is 5 hours and 37 minutes.  AHI is 2.5 and leak averages 18 minutes per night.     He has had problems with mask leak.  He attributes this to his beard.  He uses an under the nose type fullface mask.  He has tried a nasal mask with poor tolerance.     Lindley Scale is: 4/24 (End copied text).    Interval History:  Jean Glynn is a 44 y.o. male returns for follow up and compliance of PAP therapy. The patient was last seen on 4/28/2025 by Dr. Veliz. Overall the patient feels good with regard to therapy. The device appears to be working appropriately. On average the patient sleeps 4-6 hours per night. The patient wakes 0-1 time but not usually. He feels improved with use of the device had difficulty with his DME company.     The patient reports the following changes to their medical and medication history since they were last seen:  Started Testosterone    Further details are as follows:      Lindley Scale is (out of 24): Total score: 3     Weight:  Current Weight: 332 ln      The patient's relevant past medical, surgical, family, and social history reviewed and updated in Epic as appropriate.    PMH:    Past Medical History:   Diagnosis Date    Allergic     Coronary  "artery disease     Depression     HL (hearing loss)     Hyperlipidemia     Hypertension     Myocardial infarction     Obesity     Sleep apnea      Past Surgical History:   Procedure Laterality Date    ADENOIDECTOMY      TONSILLECTOMY         Allergies   Allergen Reactions    Codeine Itching, Hives and Unknown (See Comments)    Ace Inhibitors Other (See Comments), Rash and Unknown (See Comments)     Coughing, seizures  Seizures  Coughing, seizures       MEDS:  Prior to Admission medications    Medication Sig Start Date End Date Taking? Authorizing Provider   aspirin (Aspirin Low Dose) 81 MG EC tablet Take 1 tablet by mouth Daily. 7/25/24   Aakash Onofre MD   atorvastatin (LIPITOR) 80 MG tablet Take 1 tablet by mouth Daily. 4/10/25   Aakash Onofre MD   carvedilol (COREG) 25 MG tablet Take 2 tablets by mouth 2 (Two) Times a Day With Meals. 7/12/24   Aakash Onofre MD   clopidogrel (PLAVIX) 75 MG tablet Take 1 tablet by mouth Daily. 7/12/24   Aakash Onofre MD   losartan-hydrochlorothiazide (Hyzaar) 100-25 MG per tablet Take 1 tablet by mouth Daily. 7/23/25   Riley Brice MD   Needle, Disp, 22G X 1-1/2\" misc Use as directed by provider for Testosterone injections every 14 days. 7/10/25   Aakash Onofre MD   nitroglycerin (NITROSTAT) 0.4 MG SL tablet Place 1 tablet under the tongue As Needed. Take no more than 3 doses in 15 minutes. If pain persists call 911. 7/10/25   Aakash Onofre MD   Syringe 22G X 1\" 3 ML misc Use as directed by provider for Testosterone injections every 14 days. 7/10/25   Aakash Onofre MD   Testosterone 1.62 % gel Apply 2 pumps topically to the appropriate area as directed Every Morning. 6/9/25   Aakash Onofre MD   Testosterone Cypionate (DEPOTESTOTERONE CYPIONATE) 200 MG/ML injection Inject 1 mL into the appropriate muscle as directed by prescriber Every 14 (Fourteen) Days. 7/10/25   Aakash Onofre MD   Tirzepatide 5 MG/0.5ML solution auto-injector Inject 0.5 mL under the skin into the " "appropriate area as directed 1 (One) Time Per Week. 7/10/25   Aakash Onofre MD         FH:  Family History   Problem Relation Age of Onset    No Known Problems Mother     No Known Problems Father     No Known Problems Sister     No Known Problems Brother     Diabetes Paternal Uncle     Cancer Paternal Grandmother        Objective   Vital Signs:  /88   Pulse 72   Temp 98.5 °F (36.9 °C) (Infrared)   Ht 180.3 cm (71\")   Wt (!) 151 kg (332 lb 8 oz)   SpO2 96%   BMI 46.37 kg/m²     Patient's (Body mass index is 46.37 kg/m².) indicates that they are morbidly obese (BMI > 40 or > 35 with obesity - related health condition)          Physical Exam  Vitals reviewed.   Constitutional:       Appearance: Normal appearance.   HENT:      Head: Normocephalic and atraumatic.      Nose: Nose normal.      Mouth/Throat:      Mouth: Mucous membranes are moist.   Cardiovascular:      Rate and Rhythm: Normal rate and regular rhythm.      Heart sounds: No murmur heard.     No friction rub. No gallop.   Pulmonary:      Effort: Pulmonary effort is normal. No respiratory distress.      Breath sounds: Normal breath sounds. No wheezing or rhonchi.   Neurological:      Mental Status: He is alert and oriented to person, place, and time.   Psychiatric:         Behavior: Behavior normal.               Result Review :           PAP Report:  AHI: 5.9/h  Days of Usage: 27/30 (90%)  Number of Days Greater than 4 hours: 80%  Average time (days used): 5 hours 38 minutes  95th Percentile Pressure: 17.5 cmH2O  95th percentile leaks: 54 L/min  Settings: Auto CPAP-14/20 cm H2O, EPR full-time, EPR level 2, response standard       Assessment and Plan  Jean Glynn is a 44 y.o. male who returns for follow-up and compliance of PAP therapy.  The Pap report has been reviewed.  Overall usage is at 90% with compliance 80%.  The patient averaged 5 hours and 38 minutes of therapy.  Sleep apnea is controlled with an AHI of 5.9/H.    I will refill the " patient's supplies, and I have asked them to return for follow-up and compliance in 1 year or sooner should they have further questions or concerns.  I have placed orders for supplies and a full order for a device should he need to return his and obtain a new one.  I have also placed orders for repair or replacement of his current device.  If he returns his current device and obtains a new one then we will need to see him back in 31-90 days for compliance check.    Diagnoses and all orders for this visit:    1. Obstructive sleep apnea, adult (Primary)  -     Cancel: PAP Therapy  -     PAP Therapy  -     PAP Therapy    2. Morbid obesity with body mass index (BMI) of 40.0 or higher           The patient continues to use and benefit from PAP therapy.    1. The patient was counseled regarding multimodal approach with healthy nutrition, healthy sleep, regular physical activity, social activities, counseling, and medications. Encouraged to practice lateral sleep position. Avoid alcohol and sedatives close to bedtime.     2.  We will refill supplies x1 year.  Return to clinic 1 year or sooner if symptoms warrant. I have reviewed the results of my evaluation and impression and discussed my recommendations in detail with the patient.           Follow Up  Return in about 1 year (around 7/28/2026) for Annual visit.  Patient was given instructions and counseling regarding his condition or for health maintenance advice. Please see specific information pulled into the AVS if appropriate.       ROSARIO Brooks, ACNP-BC  Pulmonology, Critical Care, and Sleep Medicine

## 2025-07-28 ENCOUNTER — OFFICE VISIT (OUTPATIENT)
Dept: SLEEP MEDICINE | Age: 45
End: 2025-07-28
Payer: COMMERCIAL

## 2025-07-28 VITALS
BODY MASS INDEX: 44.1 KG/M2 | SYSTOLIC BLOOD PRESSURE: 122 MMHG | TEMPERATURE: 98.5 F | HEIGHT: 71 IN | HEART RATE: 72 BPM | DIASTOLIC BLOOD PRESSURE: 88 MMHG | WEIGHT: 315 LBS | OXYGEN SATURATION: 96 %

## 2025-07-28 DIAGNOSIS — G47.33 OBSTRUCTIVE SLEEP APNEA, ADULT: Primary | ICD-10-CM

## 2025-07-28 DIAGNOSIS — E66.01 MORBID OBESITY WITH BODY MASS INDEX (BMI) OF 40.0 OR HIGHER: ICD-10-CM

## 2025-07-28 PROCEDURE — 99213 OFFICE O/P EST LOW 20 MIN: CPT | Performed by: NURSE PRACTITIONER

## 2025-07-29 ENCOUNTER — TELEPHONE (OUTPATIENT)
Dept: SLEEP MEDICINE | Age: 45
End: 2025-07-29
Payer: COMMERCIAL

## 2025-08-07 ENCOUNTER — TELEPHONE (OUTPATIENT)
Dept: SLEEP MEDICINE | Age: 45
End: 2025-08-07
Payer: COMMERCIAL

## 2025-08-18 ENCOUNTER — HOSPITAL ENCOUNTER (EMERGENCY)
Facility: HOSPITAL | Age: 45
Discharge: HOME | End: 2025-08-18
Attending: EMERGENCY MEDICINE | Admitting: EMERGENCY MEDICINE
Payer: COMMERCIAL

## 2025-08-18 VITALS
HEIGHT: 69 IN | HEART RATE: 88 BPM | WEIGHT: 315 LBS | DIASTOLIC BLOOD PRESSURE: 85 MMHG | SYSTOLIC BLOOD PRESSURE: 141 MMHG | OXYGEN SATURATION: 96 % | TEMPERATURE: 98.3 F | BODY MASS INDEX: 46.65 KG/M2 | RESPIRATION RATE: 18 BRPM

## 2025-08-18 DIAGNOSIS — H10.9 CONJUNCTIVITIS OF BOTH EYES, UNSPECIFIED CONJUNCTIVITIS TYPE: Primary | ICD-10-CM

## 2025-08-18 PROCEDURE — 99283 EMERGENCY DEPT VISIT LOW MDM: CPT | Performed by: EMERGENCY MEDICINE

## 2025-08-18 RX ORDER — TETRACAINE HYDROCHLORIDE 5 MG/ML
2 SOLUTION OPHTHALMIC ONCE
Status: COMPLETED | OUTPATIENT
Start: 2025-08-18 | End: 2025-08-18

## 2025-08-18 RX ORDER — ERYTHROMYCIN 5 MG/G
1 OINTMENT OPHTHALMIC EVERY 6 HOURS
Qty: 3.5 G | Refills: 0 | Status: SHIPPED | OUTPATIENT
Start: 2025-08-18

## 2025-08-18 RX ORDER — FLUORESCEIN SODIUM 1 MG/MG
1 STRIP OPHTHALMIC ONCE
Status: COMPLETED | OUTPATIENT
Start: 2025-08-18 | End: 2025-08-18

## 2025-08-18 RX ADMIN — FLUORESCEIN SODIUM 1 STRIP: 1 STRIP OPHTHALMIC at 12:32

## 2025-08-18 RX ADMIN — TETRACAINE HYDROCHLORIDE 2 DROP: 5 SOLUTION OPHTHALMIC at 12:32

## 2025-08-21 ENCOUNTER — PATIENT OUTREACH (OUTPATIENT)
Dept: CASE MANAGEMENT | Facility: OTHER | Age: 45
End: 2025-08-21
Payer: COMMERCIAL